# Patient Record
Sex: FEMALE | Race: WHITE | NOT HISPANIC OR LATINO | Employment: FULL TIME | URBAN - METROPOLITAN AREA
[De-identification: names, ages, dates, MRNs, and addresses within clinical notes are randomized per-mention and may not be internally consistent; named-entity substitution may affect disease eponyms.]

---

## 2017-03-01 ENCOUNTER — OFFICE VISIT (OUTPATIENT)
Dept: INTERNAL MEDICINE | Facility: CLINIC | Age: 57
End: 2017-03-01

## 2017-03-01 VITALS
BODY MASS INDEX: 30.01 KG/M2 | DIASTOLIC BLOOD PRESSURE: 70 MMHG | SYSTOLIC BLOOD PRESSURE: 122 MMHG | HEIGHT: 68 IN | WEIGHT: 198 LBS

## 2017-03-01 DIAGNOSIS — E78.5 HYPERLIPIDEMIA, UNSPECIFIED HYPERLIPIDEMIA TYPE: Primary | ICD-10-CM

## 2017-03-01 DIAGNOSIS — E03.9 HYPOTHYROIDISM, ADULT: ICD-10-CM

## 2017-03-01 PROCEDURE — 99213 OFFICE O/P EST LOW 20 MIN: CPT | Performed by: INTERNAL MEDICINE

## 2017-03-01 RX ORDER — ALPRAZOLAM 0.5 MG/1
0.5 TABLET ORAL NIGHTLY PRN
Qty: 30 TABLET | Refills: 3 | Status: SHIPPED | OUTPATIENT
Start: 2017-03-01 | End: 2018-02-16 | Stop reason: SDUPTHER

## 2017-03-01 NOTE — PROGRESS NOTES
"Chief Complaint   Patient presents with   • Hyperlipidemia     6 month follow up   • Hypothyroidism        Subjective   Ivonne Camara is a 56 y.o. female     HPI: Hyperlipidemia:  No management changes were made at her last appointment. Her most recent lipid panel was done on 10/28/2016.  Total cholesterol was 201, Triglycerides 134, HDL 62, .  She is managing this with prudent diet and regular exercise.  She states that she could improve her habits.    Hypothyroidism: Current treatment levothyroxine. She is compliant with the medication, tolerates it well and reports good control of symptoms of hypothyroidism.  She has not noted any change in tolerance of heat or cold.  No change in hair or skin.  No constipation.  No symptoms of hyperthyroidism.                The following portions of the patient's history were reviewed and updated as appropriate: allergies, current medications, past social history, problem list, past surgical history    Review of Systems   Constitutional: Negative for activity change and appetite change.   HENT: Negative for nosebleeds.    Eyes: Negative for visual disturbance.   Respiratory: Negative for cough and shortness of breath.    Cardiovascular: Negative for chest pain, palpitations and leg swelling.   Endocrine: Negative for cold intolerance and heat intolerance.   Neurological: Negative for headaches.   Psychiatric/Behavioral: Negative for sleep disturbance.       Visit Vitals   • /70   • Ht 68\" (172.7 cm)   • Wt 198 lb (89.8 kg)   • BMI 30.11 kg/m2        Objective   Physical Exam   Constitutional: She is oriented to person, place, and time. She appears well-developed and well-nourished. No distress.   Neck: Normal carotid pulses present. Carotid bruit is not present.   Cardiovascular: Normal rate, regular rhythm, S1 normal, S2 normal and intact distal pulses.  Exam reveals no gallop and no friction rub.    No murmur heard.  Pulses:       Carotid pulses are 2+ on the " right side, and 2+ on the left side.  Pulmonary/Chest: Effort normal and breath sounds normal. No respiratory distress. She has no wheezes. She has no rhonchi. She has no rales. Chest wall is not dull to percussion.   Musculoskeletal: She exhibits no edema.   Neurological: She is alert and oriented to person, place, and time.   Skin: Skin is warm and dry.   Nursing note and vitals reviewed.      Assessment/Plan   Problem List Items Addressed This Visit        Cardiovascular and Mediastinum    Hyperlipidemia - Primary       Endocrine    Hypothyroidism, adult

## 2017-04-21 RX ORDER — LEVOTHYROXINE SODIUM 88 MCG
TABLET ORAL
Qty: 90 TABLET | Refills: 1 | Status: SHIPPED | OUTPATIENT
Start: 2017-04-21 | End: 2017-11-05 | Stop reason: SDUPTHER

## 2017-09-01 ENCOUNTER — OFFICE VISIT (OUTPATIENT)
Dept: INTERNAL MEDICINE | Facility: CLINIC | Age: 57
End: 2017-09-01

## 2017-09-01 VITALS
HEIGHT: 68 IN | DIASTOLIC BLOOD PRESSURE: 80 MMHG | WEIGHT: 200 LBS | SYSTOLIC BLOOD PRESSURE: 142 MMHG | BODY MASS INDEX: 30.31 KG/M2

## 2017-09-01 DIAGNOSIS — E78.5 HYPERLIPIDEMIA, UNSPECIFIED HYPERLIPIDEMIA TYPE: Primary | ICD-10-CM

## 2017-09-01 DIAGNOSIS — E03.9 HYPOTHYROIDISM, ADULT: ICD-10-CM

## 2017-09-01 PROCEDURE — 99213 OFFICE O/P EST LOW 20 MIN: CPT | Performed by: INTERNAL MEDICINE

## 2017-09-01 NOTE — PROGRESS NOTES
"Chief Complaint   Patient presents with   • Hyperlipidemia     6 month follow up   • Hypothyroidism        Subjective   Ivonne Camara is a 57 y.o. female     HPI: She comes in for follow-up of hyperlipidemia and hypothyroidism.  She continues to take Synthroid 88 µg daily for hypothyroidism.  She is tolerating medication well.  She denies changes in the way she tolerates heat and cold, no changes in hair or skin.  Her energy is good.  Appetite good.  She could watch her diet better in order to control her weight.  She is trying to control hyperlipidemia with diet and exercise.  She does not have chest pain or palpitations.    The following portions of the patient's history were reviewed and updated as appropriate: allergies, current medications, past social history, problem list, past surgical history    Review of Systems   Constitutional: Negative for activity change and appetite change.   HENT: Negative for nosebleeds.    Eyes: Negative for visual disturbance.   Respiratory: Negative for cough and shortness of breath.    Cardiovascular: Negative for chest pain, palpitations and leg swelling.   Neurological: Negative for headaches.   Psychiatric/Behavioral: Negative for sleep disturbance.           Objective     /80  Ht 68\" (172.7 cm)  Wt 200 lb (90.7 kg)  BMI 30.41 kg/m2     Physical Exam   Constitutional: She is oriented to person, place, and time. She appears well-developed and well-nourished. No distress.   Neck: Normal carotid pulses present. Carotid bruit is not present.   Cardiovascular: Normal rate, regular rhythm, S1 normal, S2 normal and intact distal pulses.  Exam reveals no gallop and no friction rub.    No murmur heard.  Pulses:       Carotid pulses are 2+ on the right side, and 2+ on the left side.  Pulmonary/Chest: Effort normal and breath sounds normal. No respiratory distress. She has no wheezes. She has no rhonchi. She has no rales. Chest wall is not dull to percussion. "   Musculoskeletal: She exhibits no edema.   Neurological: She is alert and oriented to person, place, and time.   Skin: Skin is warm and dry.   Nursing note and vitals reviewed.      Assessment/Plan   Problem List Items Addressed This Visit        Cardiovascular and Mediastinum    Hyperlipidemia - Primary       Endocrine    Hypothyroidism, adult    Relevant Orders    Comprehensive Metabolic Panel    Urinalysis With / Microscopic If Indicated    Lipid Panel    TSH    CBC (No Diff)      She will continue her current medications.  Encouraged prudent diet, regular exercise.  She will return for a physical without fasting labs in about 6 months.

## 2017-10-05 ENCOUNTER — APPOINTMENT (OUTPATIENT)
Dept: WOMENS IMAGING | Facility: HOSPITAL | Age: 57
End: 2017-10-05

## 2017-10-05 PROCEDURE — G0202 SCR MAMMO BI INCL CAD: HCPCS | Performed by: RADIOLOGY

## 2017-10-05 PROCEDURE — 77063 BREAST TOMOSYNTHESIS BI: CPT | Performed by: RADIOLOGY

## 2017-10-05 PROCEDURE — 77067 SCR MAMMO BI INCL CAD: CPT | Performed by: RADIOLOGY

## 2017-11-06 RX ORDER — LEVOTHYROXINE SODIUM 88 MCG
TABLET ORAL
Qty: 90 TABLET | Refills: 3 | Status: SHIPPED | OUTPATIENT
Start: 2017-11-06 | End: 2017-11-20

## 2017-11-17 ENCOUNTER — LAB (OUTPATIENT)
Dept: INTERNAL MEDICINE | Facility: CLINIC | Age: 57
End: 2017-11-17

## 2017-11-17 DIAGNOSIS — E03.9 HYPOTHYROIDISM, ADULT: ICD-10-CM

## 2017-11-17 LAB
ALBUMIN SERPL-MCNC: 4.6 G/DL (ref 3.5–5.2)
ALBUMIN/GLOB SERPL: 1.5 G/DL
ALP SERPL-CCNC: 68 U/L (ref 39–117)
ALT SERPL-CCNC: 16 U/L (ref 1–33)
AST SERPL-CCNC: 15 U/L (ref 1–32)
BACTERIA UR QL AUTO: ABNORMAL /HPF
BILIRUB SERPL-MCNC: 0.7 MG/DL (ref 0.1–1.2)
BILIRUB UR QL STRIP: NEGATIVE
BUN SERPL-MCNC: 16 MG/DL (ref 6–20)
BUN/CREAT SERPL: 16.5 (ref 7–25)
CALCIUM SERPL-MCNC: 10 MG/DL (ref 8.6–10.5)
CHLORIDE SERPL-SCNC: 102 MMOL/L (ref 98–107)
CHOLEST SERPL-MCNC: 197 MG/DL (ref 0–200)
CLARITY UR: ABNORMAL
CO2 SERPL-SCNC: 29.8 MMOL/L (ref 22–29)
COLOR UR: YELLOW
CREAT SERPL-MCNC: 0.97 MG/DL (ref 0.57–1)
DEPRECATED RDW RBC AUTO: 41.3 FL (ref 37–54)
ERYTHROCYTE [DISTWIDTH] IN BLOOD BY AUTOMATED COUNT: 12.6 % (ref 11.5–15)
GLOBULIN SER CALC-MCNC: 3 GM/DL
GLUCOSE SERPL-MCNC: 104 MG/DL (ref 65–99)
GLUCOSE UR STRIP-MCNC: NEGATIVE MG/DL
HCT VFR BLD AUTO: 40.9 % (ref 34.1–44.9)
HDLC SERPL-MCNC: 52 MG/DL (ref 40–60)
HGB BLD-MCNC: 13.5 G/DL (ref 11.2–15.7)
HGB UR QL STRIP.AUTO: ABNORMAL
HYALINE CASTS UR QL AUTO: ABNORMAL /LPF
KETONES UR QL STRIP: NEGATIVE
LDLC SERPL CALC-MCNC: 113 MG/DL (ref 0–100)
LEUKOCYTE ESTERASE UR QL STRIP.AUTO: ABNORMAL
MCH RBC QN AUTO: 30.7 PG (ref 26–34)
MCHC RBC AUTO-ENTMCNC: 33 G/DL (ref 31–37)
MCV RBC AUTO: 93 FL (ref 80–100)
MUCOUS THREADS URNS QL MICRO: ABNORMAL /HPF
NITRITE UR QL STRIP: NEGATIVE
PH UR STRIP.AUTO: 5.5 [PH] (ref 5–8)
PLATELET # BLD AUTO: 264 10*3/MM3 (ref 150–450)
PMV BLD AUTO: 10.3 FL (ref 6–12)
POTASSIUM SERPL-SCNC: 4.3 MMOL/L (ref 3.5–5.2)
PROT SERPL-MCNC: 7.6 G/DL (ref 6–8.5)
PROT UR QL STRIP: NEGATIVE
RBC # BLD AUTO: 4.4 10*6/MM3 (ref 3.93–5.22)
RBC # UR: ABNORMAL /HPF
REF LAB TEST METHOD: ABNORMAL
SODIUM SERPL-SCNC: 143 MMOL/L (ref 136–145)
SP GR UR STRIP: 1.02 (ref 1–1.03)
SQUAMOUS #/AREA URNS HPF: ABNORMAL /HPF
TRIGL SERPL-MCNC: 158 MG/DL (ref 0–150)
TSH SERPL-ACNC: 7.4 MIU/ML (ref 0.27–4.2)
UROBILINOGEN UR QL STRIP: ABNORMAL
VLDLC SERPL-MCNC: 31.6 MG/DL (ref 5–40)
WBC NRBC COR # BLD: 6.53 10*3/MM3 (ref 5–10)
WBC UR QL AUTO: ABNORMAL /HPF

## 2017-11-17 PROCEDURE — 85027 COMPLETE CBC AUTOMATED: CPT | Performed by: INTERNAL MEDICINE

## 2017-11-17 PROCEDURE — 36415 COLL VENOUS BLD VENIPUNCTURE: CPT | Performed by: INTERNAL MEDICINE

## 2017-11-17 PROCEDURE — 81001 URINALYSIS AUTO W/SCOPE: CPT | Performed by: INTERNAL MEDICINE

## 2017-11-20 RX ORDER — LEVOTHYROXINE SODIUM 100 MCG
100 TABLET ORAL DAILY
Qty: 90 TABLET | Refills: 1 | Status: SHIPPED | OUTPATIENT
Start: 2017-11-20 | End: 2017-11-27 | Stop reason: SDUPTHER

## 2017-11-27 ENCOUNTER — TELEPHONE (OUTPATIENT)
Dept: INTERNAL MEDICINE | Facility: CLINIC | Age: 57
End: 2017-11-27

## 2017-11-27 RX ORDER — LEVOTHYROXINE SODIUM 0.1 MG/1
100 TABLET ORAL DAILY
Qty: 90 TABLET | Refills: 1 | Status: SHIPPED | OUTPATIENT
Start: 2017-11-27 | End: 2018-05-18 | Stop reason: SDUPTHER

## 2017-11-27 NOTE — TELEPHONE ENCOUNTER
----- Message from Debbie Barnett sent at 11/27/2017  8:54 AM EST -----  Contact: Patient  Patient called.  Her mail order pharmacy called her for authorization on name brand medication; she would not give it due to cost, and asks that we call in generic for her.  Please advise.     Needs GENERIC FOR SYNTHROID 100 MCG tablet    Patient:  [phone]    Pharmacy:  [facility] - [place], [place] - [address] AT [address] [facility] - [phone] [facility] [phone] FX

## 2017-11-27 NOTE — TELEPHONE ENCOUNTER
----- Message from Latoya Roberts MD sent at 11/20/2017  9:59 AM EST -----  Please let her know her lab results show TSH is out of range.  I recommend we increase her dose of thyroid medication from 88 µg to 100 µg.  I sent in a prescription for this to her mail-order pharmacy.     Otherwise, her lab results are generally good.  Her glucose was just a little bit high.  Triglycerides and LDL cholesterol levels a little bit high.  These might improve with the increase in her thyroid medication.  Blood count is good.  Urinalysis good.  There were some white blood cells and bacteria in the urine.  However, there were also some skin cells in the urine.  The presence of skin cells suggests  the white blood cells and bacteria may be skin contaminant.    OK to send her a copy of the results.

## 2017-11-27 NOTE — TELEPHONE ENCOUNTER
Informed patient of lab results. She ask when is she to come redo lab work due to the increase of her thyroid med.

## 2018-01-19 DIAGNOSIS — E03.9 HYPOTHYROIDISM, ADULT: Primary | ICD-10-CM

## 2018-01-23 ENCOUNTER — LAB (OUTPATIENT)
Dept: INTERNAL MEDICINE | Facility: CLINIC | Age: 58
End: 2018-01-23

## 2018-01-23 DIAGNOSIS — E03.9 HYPOTHYROIDISM, ADULT: ICD-10-CM

## 2018-01-23 LAB — TSH SERPL-ACNC: 2.9 MIU/ML (ref 0.27–4.2)

## 2018-02-16 RX ORDER — ALPRAZOLAM 0.5 MG/1
TABLET ORAL
Qty: 30 TABLET | Refills: 2 | OUTPATIENT
Start: 2018-02-16 | End: 2018-09-11 | Stop reason: SDUPTHER

## 2018-03-02 ENCOUNTER — OFFICE VISIT (OUTPATIENT)
Dept: INTERNAL MEDICINE | Facility: CLINIC | Age: 58
End: 2018-03-02

## 2018-03-02 VITALS
DIASTOLIC BLOOD PRESSURE: 80 MMHG | HEIGHT: 68 IN | WEIGHT: 199 LBS | BODY MASS INDEX: 30.16 KG/M2 | SYSTOLIC BLOOD PRESSURE: 115 MMHG

## 2018-03-02 DIAGNOSIS — Z00.00 ANNUAL PHYSICAL EXAM: Primary | ICD-10-CM

## 2018-03-02 PROCEDURE — 93000 ELECTROCARDIOGRAM COMPLETE: CPT | Performed by: INTERNAL MEDICINE

## 2018-03-02 PROCEDURE — 99396 PREV VISIT EST AGE 40-64: CPT | Performed by: INTERNAL MEDICINE

## 2018-03-02 NOTE — PROGRESS NOTES
"Chief Complaint   Patient presents with   • Hyperlipidemia   • Hypothyroidism   • Annual Exam     physical        Subjective   Ivonne Camara is a 57 y.o. female     HPI: She is here for annual examination.    She generally feels healthy.    Her  appetite is good.    She  tries to follow a prudent diet.   She sleeps well.    Her  energy is good.     She exercises 3 days a week.   Stress: mother has dementia, lives with her.   She up to date on dental exam.  Needs to have eye exam. She is up to date on mammogram and pap. In the past, she has declined colonoscopy.       The following portions of the patient's history were reviewed and updated as appropriate: allergies, current medications, past social history, problem list, past surgical history    Review of Systems   Constitutional: Negative for appetite change, chills, fatigue and fever.   HENT: Negative for congestion, ear pain, sinus pressure, sneezing, sore throat, tinnitus, trouble swallowing and voice change.    Eyes: Positive for visual disturbance.   Respiratory: Negative for cough and shortness of breath.    Cardiovascular: Negative for chest pain, palpitations and leg swelling.   Gastrointestinal: Negative for abdominal pain, constipation, diarrhea, nausea and vomiting.   Endocrine: Negative for cold intolerance, heat intolerance, polydipsia and polyuria.   Genitourinary: Negative for dysuria and frequency.   Musculoskeletal: Negative for arthralgias, back pain and gait problem.   Skin: Negative for rash.   Allergic/Immunologic: Positive for environmental allergies (seasonal).   Neurological: Positive for headaches (maybe some dull sinus headache). Negative for dizziness and syncope.   Hematological: Negative for adenopathy. Does not bruise/bleed easily.   Psychiatric/Behavioral: Negative for decreased concentration, dysphoric mood and sleep disturbance. The patient is nervous/anxious.            Objective     /80  Ht 172.7 cm (68\")  Wt 90.3 kg " (199 lb)  BMI 30.26 kg/m2       Physical Exam   Constitutional: She is oriented to person, place, and time. She appears well-developed and well-nourished. No distress.   HENT:   Right Ear: Hearing, tympanic membrane, external ear and ear canal normal.   Left Ear: Hearing, tympanic membrane, external ear and ear canal normal.   Nose: Right sinus exhibits no maxillary sinus tenderness and no frontal sinus tenderness. Left sinus exhibits no maxillary sinus tenderness and no frontal sinus tenderness.   Eyes: Conjunctivae, EOM and lids are normal. Pupils are equal, round, and reactive to light.   Neck: Trachea normal and phonation normal. Neck supple. Normal carotid pulses and no JVD present. Carotid bruit is not present. No thyroid mass and no thyromegaly present.   Cardiovascular: Normal rate, regular rhythm, S1 normal and S2 normal.  PMI is not displaced.  Exam reveals no gallop and no friction rub.    No murmur heard.  Pulses:       Carotid pulses are 2+ on the right side, and 2+ on the left side.       Radial pulses are 2+ on the right side, and 2+ on the left side.        Dorsalis pedis pulses are 2+ on the right side, and 2+ on the left side.   Pulmonary/Chest: Effort normal and breath sounds normal. She has no wheezes. She has no rhonchi. She has no rales. Chest wall is not dull to percussion.   Abdominal: Soft. Normal appearance, normal aorta and bowel sounds are normal. She exhibits no abdominal bruit and no mass. There is no hepatosplenomegaly. There is no tenderness.   Musculoskeletal: Normal range of motion. She exhibits no edema or tenderness.   Lymphadenopathy:     She has no cervical adenopathy.        Right: No supraclavicular adenopathy present.        Left: No supraclavicular adenopathy present.   Neurological: She is alert and oriented to person, place, and time. She has normal strength and normal reflexes. No cranial nerve deficit or sensory deficit. Coordination normal.   Skin: Skin is warm and  dry. No rash noted.   Psychiatric: She has a normal mood and affect. Her speech is normal and behavior is normal. Judgment and thought content normal. Cognition and memory are normal. She is attentive.   Nursing note and vitals reviewed.      ECG 12 Lead  Date/Time: 3/2/2018 9:30 AM  Performed by: GRIFFIN GARBER  Authorized by: WILLOW LACEY   Comparison: compared with previous ECG from 9/21/2015  Similar to previous ECG  Rhythm: sinus rhythm  Rate: normal  BPM: 67  Conduction: conduction normal  ST Segments: ST segments normal  T Waves: T waves normal  QRS axis: normal  Clinical impression: normal ECG            Assessment/Plan   Problem List Items Addressed This Visit     None      Visit Diagnoses     Annual physical exam    -  Primary    Relevant Orders    ECG 12 Lead        Encouraged prudent diet and regular exercise.  Reviewed labs done in November.

## 2018-05-18 RX ORDER — LEVOTHYROXINE SODIUM 100 MCG
TABLET ORAL
Qty: 90 TABLET | Refills: 1 | Status: SHIPPED | OUTPATIENT
Start: 2018-05-18 | End: 2018-08-17 | Stop reason: SDUPTHER

## 2018-08-17 RX ORDER — LEVOTHYROXINE SODIUM 100 MCG
TABLET ORAL
Qty: 90 TABLET | Refills: 1 | Status: SHIPPED | OUTPATIENT
Start: 2018-08-17 | End: 2019-05-20 | Stop reason: SDUPTHER

## 2018-09-11 ENCOUNTER — RESULTS ENCOUNTER (OUTPATIENT)
Dept: INTERNAL MEDICINE | Facility: CLINIC | Age: 58
End: 2018-09-11

## 2018-09-11 ENCOUNTER — OFFICE VISIT (OUTPATIENT)
Dept: INTERNAL MEDICINE | Facility: CLINIC | Age: 58
End: 2018-09-11

## 2018-09-11 VITALS
SYSTOLIC BLOOD PRESSURE: 112 MMHG | HEART RATE: 83 BPM | BODY MASS INDEX: 30.01 KG/M2 | HEIGHT: 68 IN | OXYGEN SATURATION: 96 % | WEIGHT: 198 LBS | DIASTOLIC BLOOD PRESSURE: 70 MMHG

## 2018-09-11 DIAGNOSIS — Z12.11 SCREENING FOR COLON CANCER: ICD-10-CM

## 2018-09-11 DIAGNOSIS — E03.9 HYPOTHYROIDISM, ADULT: Primary | ICD-10-CM

## 2018-09-11 DIAGNOSIS — F41.9 ANXIETY: ICD-10-CM

## 2018-09-11 LAB — TSH SERPL DL<=0.05 MIU/L-ACNC: 2.18 MIU/ML (ref 0.27–4.2)

## 2018-09-11 PROCEDURE — 84443 ASSAY THYROID STIM HORMONE: CPT | Performed by: INTERNAL MEDICINE

## 2018-09-11 PROCEDURE — 36415 COLL VENOUS BLD VENIPUNCTURE: CPT | Performed by: INTERNAL MEDICINE

## 2018-09-11 PROCEDURE — 99213 OFFICE O/P EST LOW 20 MIN: CPT | Performed by: INTERNAL MEDICINE

## 2018-09-11 RX ORDER — ALPRAZOLAM 0.5 MG/1
0.5 TABLET ORAL NIGHTLY PRN
Qty: 30 TABLET | Refills: 5 | Status: SHIPPED | OUTPATIENT
Start: 2018-09-11 | End: 2019-03-11 | Stop reason: SDUPTHER

## 2018-09-11 NOTE — PROGRESS NOTES
"Chief Complaint   Patient presents with   • Hyperlipidemia     6 month follow up   • Hypothyroidism   • Anxiety       Subjective   Ivonne Camara is a 58 y.o. female.     History of Present Illness        Hypothyroidism: Current treatment levothyroxine. She is compliant with the medication, tolerates it well and reports good control of symptoms of hypothyroidism.  She has not noted any change in tolerance of heat or cold.  No change in hair or skin.  No constipation.  No symptoms of hyperthyroidism.      Anxiety: She continues to have some for both anxiety.  She finds that it mainly interferes with her sleep.  She takes alprazolam at bedtime.  She does not need to do this every night.  Her elderly mother who has dementia lives with her and this is a source of anxiety.    The following portions of the patient's history were reviewed and updated as appropriate: allergies, current medications, past family history, past medical history, past social history, past surgical history and problem list.    Review of Systems   Constitutional: Negative for appetite change.   HENT: Negative for nosebleeds.    Eyes: Negative for blurred vision and double vision.   Respiratory: Negative for cough and shortness of breath.    Cardiovascular: Negative for chest pain, palpitations and leg swelling.   Endocrine: Negative for cold intolerance and heat intolerance.         Current Outpatient Prescriptions:   •  ALPRAZolam (XANAX) 0.5 MG tablet, Take 1 tablet by mouth At Night As Needed for Anxiety., Disp: 30 tablet, Rfl: 5  •  SYNTHROID 100 MCG tablet, TAKE 1 TABLET DAILY, Disp: 90 tablet, Rfl: 1        Objective     /70   Pulse 83   Ht 172.7 cm (68\")   Wt 89.8 kg (198 lb)   SpO2 96%   BMI 30.11 kg/m²     Physical Exam   Constitutional: She is oriented to person, place, and time. She appears well-developed and well-nourished. No distress.   Neck: Normal carotid pulses present. Carotid bruit is not present.   Cardiovascular: " Regular rhythm, S1 normal and S2 normal.  Exam reveals no gallop and no friction rub.    No murmur heard.  Pulses:       Carotid pulses are 2+ on the right side, and 2+ on the left side.  Pulmonary/Chest: Effort normal and breath sounds normal. She has no wheezes. She has no rhonchi. She has no rales. Chest wall is not dull to percussion.   Musculoskeletal: She exhibits no edema.   Neurological: She is alert and oriented to person, place, and time.   Skin: Skin is warm and dry.   Nursing note and vitals reviewed.        Assessment/Plan   Ivonne was seen today for hyperlipidemia, hypothyroidism and anxiety.    Diagnoses and all orders for this visit:    Hypothyroidism, adult  -     TSH Rfx On Abnormal To Free T4; Future  -     TSH Rfx On Abnormal To Free T4    Anxiety    Screening for colon cancer  -     Cologuard - Stool, Per Rectum; Future    Other orders  -     ALPRAZolam (XANAX) 0.5 MG tablet; Take 1 tablet by mouth At Night As Needed for Anxiety.      Discussed screening colonoscopy.  She would rather not do that.  She would like to do cologuard.

## 2018-10-10 ENCOUNTER — APPOINTMENT (OUTPATIENT)
Dept: WOMENS IMAGING | Facility: HOSPITAL | Age: 58
End: 2018-10-10

## 2018-10-10 PROCEDURE — 77067 SCR MAMMO BI INCL CAD: CPT | Performed by: RADIOLOGY

## 2018-10-10 PROCEDURE — 77063 BREAST TOMOSYNTHESIS BI: CPT | Performed by: RADIOLOGY

## 2019-03-11 ENCOUNTER — OFFICE VISIT (OUTPATIENT)
Dept: INTERNAL MEDICINE | Facility: CLINIC | Age: 59
End: 2019-03-11

## 2019-03-11 VITALS
BODY MASS INDEX: 30.16 KG/M2 | SYSTOLIC BLOOD PRESSURE: 122 MMHG | WEIGHT: 199 LBS | DIASTOLIC BLOOD PRESSURE: 68 MMHG | HEIGHT: 68 IN

## 2019-03-11 DIAGNOSIS — Z11.59 SCREENING FOR VIRAL DISEASE: ICD-10-CM

## 2019-03-11 DIAGNOSIS — Z00.00 ANNUAL PHYSICAL EXAM: Primary | ICD-10-CM

## 2019-03-11 LAB
ALBUMIN SERPL-MCNC: 4.1 G/DL (ref 3.5–5.2)
ALBUMIN/GLOB SERPL: 1.4 G/DL
ALP SERPL-CCNC: 61 U/L (ref 39–117)
ALT SERPL W P-5'-P-CCNC: 17 U/L (ref 1–33)
ANION GAP SERPL CALCULATED.3IONS-SCNC: 13.3 MMOL/L
AST SERPL-CCNC: 13 U/L (ref 1–32)
BACTERIA UR QL AUTO: ABNORMAL /HPF
BASOPHILS # BLD AUTO: 0.02 10*3/MM3 (ref 0–0.2)
BASOPHILS NFR BLD AUTO: 0.3 % (ref 0–1.5)
BILIRUB SERPL-MCNC: 0.4 MG/DL (ref 0.1–1.2)
BILIRUB UR QL STRIP: NEGATIVE
BUN BLD-MCNC: 11 MG/DL (ref 6–20)
BUN/CREAT SERPL: 11.8 (ref 7–25)
CALCIUM SPEC-SCNC: 9.5 MG/DL (ref 8.6–10.5)
CHLORIDE SERPL-SCNC: 103 MMOL/L (ref 98–107)
CHOLEST SERPL-MCNC: 160 MG/DL (ref 0–200)
CLARITY UR: ABNORMAL
CO2 SERPL-SCNC: 26.7 MMOL/L (ref 22–29)
COLOR UR: YELLOW
CREAT BLD-MCNC: 0.93 MG/DL (ref 0.57–1)
DEPRECATED RDW RBC AUTO: 42.5 FL (ref 37–54)
EOSINOPHIL # BLD AUTO: 0.1 10*3/MM3 (ref 0–0.4)
EOSINOPHIL NFR BLD AUTO: 1.6 % (ref 0.3–6.2)
ERYTHROCYTE [DISTWIDTH] IN BLOOD BY AUTOMATED COUNT: 13 % (ref 12.3–15.4)
GFR SERPL CREATININE-BSD FRML MDRD: 62 ML/MIN/1.73
GLOBULIN UR ELPH-MCNC: 2.9 GM/DL
GLUCOSE BLD-MCNC: 107 MG/DL (ref 65–99)
GLUCOSE UR STRIP-MCNC: NEGATIVE MG/DL
HCT VFR BLD AUTO: 39.6 % (ref 34–46.6)
HDLC SERPL-MCNC: 51 MG/DL (ref 40–60)
HGB BLD-MCNC: 12.9 G/DL (ref 12–15.9)
HGB UR QL STRIP.AUTO: ABNORMAL
HYALINE CASTS UR QL AUTO: ABNORMAL /LPF
KETONES UR QL STRIP: ABNORMAL
LDLC SERPL CALC-MCNC: 87 MG/DL (ref 0–100)
LDLC/HDLC SERPL: 1.71 {RATIO}
LEUKOCYTE ESTERASE UR QL STRIP.AUTO: ABNORMAL
LYMPHOCYTES # BLD AUTO: 2.32 10*3/MM3 (ref 0.7–3.1)
LYMPHOCYTES NFR BLD AUTO: 36.9 % (ref 19.6–45.3)
MCH RBC QN AUTO: 29.8 PG (ref 26.6–33)
MCHC RBC AUTO-ENTMCNC: 32.6 G/DL (ref 31.5–35.7)
MCV RBC AUTO: 91.5 FL (ref 79–97)
MONOCYTES # BLD AUTO: 0.49 10*3/MM3 (ref 0.1–0.9)
MONOCYTES NFR BLD AUTO: 7.8 % (ref 5–12)
MUCOUS THREADS URNS QL MICRO: ABNORMAL /HPF
NEUTROPHILS # BLD AUTO: 3.35 10*3/MM3 (ref 1.4–7)
NEUTROPHILS NFR BLD AUTO: 53.4 % (ref 42.7–76)
NITRITE UR QL STRIP: NEGATIVE
PH UR STRIP.AUTO: 5 [PH] (ref 5–8)
PLATELET # BLD AUTO: 272 10*3/MM3 (ref 140–450)
PMV BLD AUTO: 11 FL (ref 6–12)
POTASSIUM BLD-SCNC: 4.3 MMOL/L (ref 3.5–5.2)
PROT SERPL-MCNC: 7 G/DL (ref 6–8.5)
PROT UR QL STRIP: NEGATIVE
RBC # BLD AUTO: 4.33 10*6/MM3 (ref 3.77–5.28)
RBC # UR: ABNORMAL /HPF
REF LAB TEST METHOD: ABNORMAL
SODIUM BLD-SCNC: 143 MMOL/L (ref 136–145)
SP GR UR STRIP: >=1.03 (ref 1–1.03)
SQUAMOUS #/AREA URNS HPF: ABNORMAL /HPF
TRIGL SERPL-MCNC: 108 MG/DL (ref 0–150)
UROBILINOGEN UR QL STRIP: ABNORMAL
VLDLC SERPL-MCNC: 21.6 MG/DL (ref 5–40)
WBC NRBC COR # BLD: 6.28 10*3/MM3 (ref 3.4–10.8)
WBC UR QL AUTO: ABNORMAL /HPF

## 2019-03-11 PROCEDURE — 80061 LIPID PANEL: CPT | Performed by: INTERNAL MEDICINE

## 2019-03-11 PROCEDURE — 80053 COMPREHEN METABOLIC PANEL: CPT | Performed by: INTERNAL MEDICINE

## 2019-03-11 PROCEDURE — 81001 URINALYSIS AUTO W/SCOPE: CPT | Performed by: INTERNAL MEDICINE

## 2019-03-11 PROCEDURE — 85025 COMPLETE CBC W/AUTO DIFF WBC: CPT | Performed by: INTERNAL MEDICINE

## 2019-03-11 PROCEDURE — 99396 PREV VISIT EST AGE 40-64: CPT | Performed by: INTERNAL MEDICINE

## 2019-03-11 RX ORDER — ALPRAZOLAM 0.5 MG/1
0.5 TABLET ORAL NIGHTLY PRN
Qty: 30 TABLET | Refills: 5 | Status: SHIPPED | OUTPATIENT
Start: 2019-03-11 | End: 2019-09-11 | Stop reason: SDUPTHER

## 2019-03-11 NOTE — PROGRESS NOTES
Chief Complaint   Patient presents with   • Annual Exam     physical       Subjective   Ivonne Camara is a 58 y.o. female.     History of Present Illness     She is here for annual examination.  She generally feels healthy. Her  appetite is good.  She  follows a prudent diet.   She sleeps well.  Stress can interfere with sleep.  Xanax helps, also using lavender essential oil.  Stress due to mother's dementia, lives with her.   Her  energy is good.     She exercises regularly.  Gym 3 days a week.  She is due for eye exam.  She is up to date on dental exam. She sees gyn for pap, usually in October. She does 3-D mammogram at [facility].              The following portions of the patient's history were reviewed and updated as appropriate: allergies, current medications, past family history, past medical history, past social history, past surgical history and problem list.    Review of Systems   Constitutional: Negative for appetite change, chills, fatigue, fever, unexpected weight gain and unexpected weight loss.   HENT: Negative for congestion, nosebleeds, sinus pressure, sore throat, tinnitus, trouble swallowing and voice change.    Eyes: Negative for blurred vision and double vision.   Respiratory: Negative for cough and shortness of breath.    Cardiovascular: Negative for chest pain, palpitations and leg swelling.   Gastrointestinal: Negative for abdominal pain, constipation, diarrhea, nausea, vomiting and indigestion.   Endocrine: Negative for cold intolerance, heat intolerance, polydipsia and polyuria.   Genitourinary: Negative for breast discharge, dysuria, frequency and breast lump.   Musculoskeletal: Negative for arthralgias and back pain.   Skin: Negative for rash.   Neurological: Negative for dizziness, syncope and headache.   Hematological: Negative for adenopathy. Does not bruise/bleed easily.   Psychiatric/Behavioral: Negative for sleep disturbance and depressed mood. The patient is  "nervous/anxious.          Current Outpatient Medications:   •  ALPRAZolam (XANAX) 0.5 MG tablet, Take 1 tablet by mouth At Night As Needed for Anxiety., Disp: 30 tablet, Rfl: 5  •  SYNTHROID 100 MCG tablet, TAKE 1 TABLET DAILY, Disp: 90 tablet, Rfl: 1        Objective     /68 (BP Location: Left arm, Patient Position: Sitting, Cuff Size: Adult)   Ht 172.7 cm (68\")   Wt 90.3 kg (199 lb)   BMI 30.26 kg/m²     Physical Exam   Constitutional: She is oriented to person, place, and time. She appears well-developed and well-nourished. No distress.   HENT:   Right Ear: Tympanic membrane and ear canal normal.   Left Ear: Tympanic membrane and ear canal normal.   Nose: Right sinus exhibits no maxillary sinus tenderness and no frontal sinus tenderness. Left sinus exhibits no maxillary sinus tenderness and no frontal sinus tenderness.   Mouth/Throat: Oropharynx is clear and moist.   Eyes: Conjunctivae and EOM are normal. Pupils are equal, round, and reactive to light. No scleral icterus.   Neck: Normal range of motion. Neck supple. Normal carotid pulses present. Carotid bruit is not present. No tracheal deviation present. No thyromegaly present.   Cardiovascular: Regular rhythm, S1 normal, S2 normal and intact distal pulses. Exam reveals no gallop and no friction rub.   No murmur heard.  Pulses:       Carotid pulses are 2+ on the right side, and 2+ on the left side.  Pulmonary/Chest: Effort normal and breath sounds normal. She has no wheezes. She has no rhonchi. She has no rales. Chest wall is not dull to percussion.   Abdominal: Soft. Normal appearance and bowel sounds are normal. She exhibits no abdominal bruit. There is no hepatosplenomegaly. There is no tenderness. There is no rebound and no guarding.   Musculoskeletal: She exhibits no edema.   Lymphadenopathy:     She has no cervical adenopathy.   Neurological: She is alert and oriented to person, place, and time. No cranial nerve deficit. Coordination normal. "   Skin: Skin is warm and dry.   Psychiatric: She has a normal mood and affect.   Nursing note and vitals reviewed.        Assessment/Plan   Ivonne was seen today for annual exam.    Diagnoses and all orders for this visit:    Annual physical exam  -     Comprehensive Metabolic Panel; Future  -     CBC & Differential; Future  -     Urinalysis With Microscopic If Indicated (No Culture) - Urine, Clean Catch; Future  -     Lipid Panel; Future  -     TSH Rfx On Abnormal To Free T4; Future    Screening for viral disease  -     Hepatitis C Antibody; Future  -     Varicella zoster antibody, IgG; Future    Other orders  -     ALPRAZolam (XANAX) 0.5 MG tablet; Take 1 tablet by mouth At Night As Needed for Anxiety.

## 2019-03-11 NOTE — PATIENT INSTRUCTIONS
Check with your pharmacy about the new shingles vaccine.     Follow prudent diet, continue regular exercise    If the antibody level to varicella zoster is positive, it shows you had chicken pox and should get the shingles vaccine.

## 2019-03-12 LAB
HCV AB S/CO SERPL IA: <0.1 S/CO RATIO (ref 0–0.9)
TSH SERPL-ACNC: 2.64 MIU/ML (ref 0.27–4.2)
VZV IGG SER IA-ACNC: <135 INDEX

## 2019-05-20 RX ORDER — LEVOTHYROXINE SODIUM 100 MCG
TABLET ORAL
Qty: 90 TABLET | Refills: 1 | Status: SHIPPED | OUTPATIENT
Start: 2019-05-20 | End: 2019-11-14 | Stop reason: SDUPTHER

## 2019-09-11 ENCOUNTER — OFFICE VISIT (OUTPATIENT)
Dept: INTERNAL MEDICINE | Facility: CLINIC | Age: 59
End: 2019-09-11

## 2019-09-11 VITALS
BODY MASS INDEX: 29.55 KG/M2 | SYSTOLIC BLOOD PRESSURE: 124 MMHG | HEART RATE: 69 BPM | OXYGEN SATURATION: 96 % | DIASTOLIC BLOOD PRESSURE: 80 MMHG | WEIGHT: 195 LBS | HEIGHT: 68 IN

## 2019-09-11 DIAGNOSIS — E78.5 HYPERLIPIDEMIA, UNSPECIFIED HYPERLIPIDEMIA TYPE: ICD-10-CM

## 2019-09-11 DIAGNOSIS — E03.9 HYPOTHYROIDISM, ADULT: Primary | ICD-10-CM

## 2019-09-11 DIAGNOSIS — F41.9 ANXIETY: ICD-10-CM

## 2019-09-11 PROCEDURE — 99214 OFFICE O/P EST MOD 30 MIN: CPT | Performed by: INTERNAL MEDICINE

## 2019-09-11 RX ORDER — ALPRAZOLAM 0.5 MG/1
0.5 TABLET ORAL NIGHTLY PRN
Qty: 30 TABLET | Refills: 5 | Status: SHIPPED | OUTPATIENT
Start: 2019-09-11 | End: 2020-07-14

## 2019-09-11 NOTE — PROGRESS NOTES
Chief Complaint   Patient presents with   • Hypothyroidism     6 month follow up   • Hyperlipidemia       Subjective   Ivonne Camara is a 59 y.o. female.     Hypothyroidism   This is a chronic problem. Progression since onset: Controlled. Pertinent negatives include no chest pain or coughing. Associated symptoms comments: No changes in heat/cold tolerance, no change hair/skin.  No fatigue. . Treatments tried: Synthroid. The treatment provided significant relief.   Hyperlipidemia   This is a chronic problem. Recent lipid tests were reviewed and are variable. Exacerbating diseases include hypothyroidism. There are no known factors aggravating her hyperlipidemia. Pertinent negatives include no chest pain or shortness of breath. Current antihyperlipidemic treatment includes diet change and exercise. The current treatment provides moderate improvement of lipids. There are no compliance problems.    Insomnia   This is a chronic problem. The current episode started more than 1 year ago. The problem occurs daily. The problem has been unchanged. Pertinent negatives include no chest pain or coughing. The symptoms are aggravated by stress. Treatments tried: Alprazolam. The treatment provided significant relief.   She has problems with middle insomnia.  She states she is able to fall asleep okay but wakes up during the night.  Part of the problem may be that her  likes to watch television.  Sometimes she will wake up, the television will be on but he is asleep.  She turns off the television.  If he wakes up, he turns the television back on.  Also, she has had problems with stress and anxiety contributing to this.  She tries to maintain regular sleep habits.  She does not drink caffeine late in the day.  She does not drink alcohol to excess.  She does not take alprazolam every night.    The following portions of the patient's history were reviewed and updated as appropriate: allergies, current medications, past family  "history, past medical history, past social history, past surgical history and problem list.    Review of Systems   Constitutional: Negative for appetite change.   HENT: Negative for nosebleeds.    Eyes: Negative for blurred vision and double vision.        Wears a contact for distance vision.     Respiratory: Negative for cough and shortness of breath.    Cardiovascular: Negative for chest pain, palpitations and leg swelling.   Psychiatric/Behavioral: Positive for sleep disturbance. The patient has insomnia.          Current Outpatient Medications:   •  ALPRAZolam (XANAX) 0.5 MG tablet, Take 1 tablet by mouth At Night As Needed for Anxiety., Disp: 30 tablet, Rfl: 5  •  SYNTHROID 100 MCG tablet, TAKE 1 TABLET DAILY, Disp: 90 tablet, Rfl: 1        Objective     /80   Pulse 69   Ht 172.7 cm (68\")   Wt 88.5 kg (195 lb)   SpO2 96%   BMI 29.65 kg/m²     Physical Exam   Constitutional: She is oriented to person, place, and time. She appears well-developed and well-nourished. No distress.   Neck: Normal carotid pulses present. Carotid bruit is not present.   Cardiovascular: Regular rhythm, S1 normal and S2 normal. Exam reveals no gallop and no friction rub.   No murmur heard.  Pulses:       Carotid pulses are 2+ on the right side, and 2+ on the left side.  Pulmonary/Chest: Effort normal and breath sounds normal. She has no wheezes. She has no rhonchi. She has no rales. Chest wall is not dull to percussion.   Musculoskeletal: She exhibits no edema.   Neurological: She is alert and oriented to person, place, and time.   Skin: Skin is warm and dry.   Nursing note and vitals reviewed.        Assessment/Plan   Ivonne was seen today for hypothyroidism and hyperlipidemia.    Diagnoses and all orders for this visit:    Hypothyroidism, adult    Hyperlipidemia, unspecified hyperlipidemia type    Anxiety    Other orders  -     ALPRAZolam (XANAX) 0.5 MG tablet; Take 1 tablet by mouth At Night As Needed for Anxiety.      She " will continue her current medications.  Encouraged her to continue with regular sleep habits.  Ideally, the TV would be turned off at bedtime.

## 2019-10-14 ENCOUNTER — APPOINTMENT (OUTPATIENT)
Dept: WOMENS IMAGING | Facility: HOSPITAL | Age: 59
End: 2019-10-14

## 2019-10-14 PROCEDURE — 77067 SCR MAMMO BI INCL CAD: CPT | Performed by: RADIOLOGY

## 2019-10-14 PROCEDURE — 77063 BREAST TOMOSYNTHESIS BI: CPT | Performed by: RADIOLOGY

## 2019-11-14 RX ORDER — LEVOTHYROXINE SODIUM 100 MCG
TABLET ORAL
Qty: 90 TABLET | Refills: 0 | Status: SHIPPED | OUTPATIENT
Start: 2019-11-14 | End: 2020-02-10

## 2019-12-27 ENCOUNTER — OFFICE VISIT (OUTPATIENT)
Dept: INTERNAL MEDICINE | Facility: CLINIC | Age: 59
End: 2019-12-27

## 2019-12-27 VITALS
TEMPERATURE: 98.9 F | DIASTOLIC BLOOD PRESSURE: 80 MMHG | OXYGEN SATURATION: 94 % | WEIGHT: 197 LBS | HEART RATE: 87 BPM | BODY MASS INDEX: 29.86 KG/M2 | SYSTOLIC BLOOD PRESSURE: 104 MMHG | HEIGHT: 68 IN

## 2019-12-27 DIAGNOSIS — J06.9 ACUTE URI: Primary | ICD-10-CM

## 2019-12-27 PROCEDURE — 99213 OFFICE O/P EST LOW 20 MIN: CPT | Performed by: NURSE PRACTITIONER

## 2019-12-27 RX ORDER — GUAIFENESIN 600 MG/1
600 TABLET, EXTENDED RELEASE ORAL EVERY 12 HOURS SCHEDULED
Qty: 14 TABLET
Start: 2019-12-27 | End: 2020-01-03

## 2019-12-27 RX ORDER — AZITHROMYCIN 250 MG/1
TABLET, FILM COATED ORAL
Qty: 6 TABLET | Refills: 0 | Status: SHIPPED | OUTPATIENT
Start: 2019-12-27 | End: 2020-01-02

## 2019-12-27 RX ORDER — LORATADINE 10 MG/1
10 TABLET ORAL DAILY
Qty: 10 TABLET
Start: 2019-12-27 | End: 2020-01-06

## 2019-12-27 NOTE — PROGRESS NOTES
Subjective   Ivonne Camara is a 59 y.o. female who presents due to respiratory symptoms.    Cough   Associated symptoms include ear pain, headaches, postnasal drip, rhinorrhea and a sore throat. Pertinent negatives include no chest pain, chills, fever, shortness of breath or wheezing.   URI    This is a new problem. The current episode started in the past 7 days. The problem has been rapidly worsening. There has been no fever. Associated symptoms include congestion, coughing, ear pain, headaches, rhinorrhea and a sore throat. Pertinent negatives include no abdominal pain, chest pain, diarrhea, nausea, neck pain, sneezing, vomiting or wheezing. Treatments tried: Mucinex. The treatment provided mild relief.        The following portions of the patient's history were reviewed and updated as appropriate: allergies, current medications, past social history and problem list.    Past Medical History:   Diagnosis Date   • H/O mammogram    • Hypercholesterolemia    • Hypothyroidism    • Renal calculi          Current Outpatient Medications:   •  ALPRAZolam (XANAX) 0.5 MG tablet, Take 1 tablet by mouth At Night As Needed for Anxiety., Disp: 30 tablet, Rfl: 5  •  SYNTHROID 100 MCG tablet, TAKE 1 TABLET DAILY, Disp: 90 tablet, Rfl: 0  •  azithromycin (ZITHROMAX Z-MILAGROS) 250 MG tablet, Take 2 tablets the first day, then 1 tablet daily for 4 days., Disp: 6 tablet, Rfl: 0  •  guaiFENesin (MUCINEX) 600 MG 12 hr tablet, Take 1 tablet by mouth Every 12 (Twelve) Hours for 7 days., Disp: 14 tablet, Rfl:   •  loratadine (CLARITIN) 10 MG tablet, Take 1 tablet by mouth Daily for 10 days., Disp: 10 tablet, Rfl:     No Known Allergies    Review of Systems   Constitutional: Negative for appetite change, chills, fatigue and fever.   HENT: Positive for congestion, ear pain, postnasal drip, rhinorrhea, sinus pressure and sore throat. Negative for ear discharge, facial swelling, sneezing and tinnitus.    Respiratory: Positive for cough.  "Negative for chest tightness, shortness of breath and wheezing.    Cardiovascular: Negative for chest pain, palpitations and leg swelling.   Gastrointestinal: Negative for abdominal pain, diarrhea, nausea and vomiting.   Musculoskeletal: Negative for neck pain and neck stiffness.   Neurological: Positive for headaches.   Hematological: Negative for adenopathy.       Objective   Vitals:    12/27/19 0811   BP: 104/80   Pulse: 87   Temp: 98.9 °F (37.2 °C)   SpO2: 94%   Weight: 89.4 kg (197 lb)   Height: 172.7 cm (67.99\")     Body mass index is 29.96 kg/m².  Physical Exam   Constitutional: She appears well-developed and well-nourished. She is cooperative. She does not have a sickly appearance. She does not appear ill.   HENT:   Head: Normocephalic.   Right Ear: Hearing and external ear normal. No drainage, swelling or tenderness. Tympanic membrane is bulging. Tympanic membrane is not erythematous. No middle ear effusion. No decreased hearing is noted.   Left Ear: Hearing and external ear normal. No drainage, swelling or tenderness. Tympanic membrane is bulging. Tympanic membrane is not erythematous.  No middle ear effusion. No decreased hearing is noted.   Nose: Nose normal. No mucosal edema, rhinorrhea, sinus tenderness or nasal deformity. Right sinus exhibits no maxillary sinus tenderness and no frontal sinus tenderness. Left sinus exhibits no maxillary sinus tenderness and no frontal sinus tenderness.   Mouth/Throat: Mucous membranes are normal. Posterior oropharyngeal erythema present.   Eyes: Conjunctivae and lids are normal.   Neck: Trachea normal.   Cardiovascular: Regular rhythm, normal heart sounds and normal pulses.   No murmur heard.  Pulmonary/Chest: Breath sounds normal. No respiratory distress. She has no decreased breath sounds. She has no wheezes. She has no rhonchi. She has no rales.   Lymphadenopathy:     She has no cervical adenopathy.   Neurological: She is alert.   Skin: Skin is warm, dry and " intact.   Nursing note and vitals reviewed.      Assessment/Plan   Ivonne was seen today for sore throat and cough.    Diagnoses and all orders for this visit:    Acute URI  -     azithromycin (ZITHROMAX Z-MILAGROS) 250 MG tablet; Take 2 tablets the first day, then 1 tablet daily for 4 days.  -     guaiFENesin (MUCINEX) 600 MG 12 hr tablet; Take 1 tablet by mouth Every 12 (Twelve) Hours for 7 days.  -     loratadine (CLARITIN) 10 MG tablet; Take 1 tablet by mouth Daily for 10 days.    She will continue Mucinex for increased postnasal drainage and cough.

## 2020-02-10 RX ORDER — LEVOTHYROXINE SODIUM 100 MCG
TABLET ORAL
Qty: 90 TABLET | Refills: 1 | Status: SHIPPED | OUTPATIENT
Start: 2020-02-10 | End: 2020-07-27

## 2020-03-09 DIAGNOSIS — Z00.00 ANNUAL PHYSICAL EXAM: Primary | ICD-10-CM

## 2020-03-10 ENCOUNTER — LAB (OUTPATIENT)
Dept: INTERNAL MEDICINE | Facility: CLINIC | Age: 60
End: 2020-03-10

## 2020-03-10 DIAGNOSIS — R82.90 ABNORMAL URINE FINDINGS: ICD-10-CM

## 2020-03-10 DIAGNOSIS — Z00.00 ANNUAL PHYSICAL EXAM: Primary | ICD-10-CM

## 2020-03-10 LAB
ALBUMIN SERPL-MCNC: 4.1 G/DL (ref 3.5–5.2)
ALBUMIN/GLOB SERPL: 1.4 G/DL
ALP SERPL-CCNC: 62 U/L (ref 39–117)
ALT SERPL W P-5'-P-CCNC: 23 U/L (ref 1–33)
ANION GAP SERPL CALCULATED.3IONS-SCNC: 10.6 MMOL/L (ref 5–15)
AST SERPL-CCNC: 22 U/L (ref 1–32)
BACTERIA UR QL AUTO: ABNORMAL /HPF
BILIRUB SERPL-MCNC: 0.5 MG/DL (ref 0.2–1.2)
BILIRUB UR QL STRIP: NEGATIVE
BUN BLD-MCNC: 16 MG/DL (ref 6–20)
BUN/CREAT SERPL: 15.7 (ref 7–25)
CALCIUM SPEC-SCNC: 9.4 MG/DL (ref 8.6–10.5)
CHLORIDE SERPL-SCNC: 102 MMOL/L (ref 98–107)
CHOLEST SERPL-MCNC: 181 MG/DL (ref 0–200)
CLARITY UR: ABNORMAL
CO2 SERPL-SCNC: 27.4 MMOL/L (ref 22–29)
COLOR UR: YELLOW
CREAT BLD-MCNC: 1.02 MG/DL (ref 0.57–1)
DEPRECATED RDW RBC AUTO: 41.4 FL (ref 37–54)
ERYTHROCYTE [DISTWIDTH] IN BLOOD BY AUTOMATED COUNT: 12.6 % (ref 12.3–15.4)
GFR SERPL CREATININE-BSD FRML MDRD: 56 ML/MIN/1.73
GLOBULIN UR ELPH-MCNC: 2.9 GM/DL
GLUCOSE BLD-MCNC: 104 MG/DL (ref 65–99)
GLUCOSE UR STRIP-MCNC: NEGATIVE MG/DL
HCT VFR BLD AUTO: 41.7 % (ref 34–46.6)
HDLC SERPL-MCNC: 52 MG/DL (ref 40–60)
HGB BLD-MCNC: 13.5 G/DL (ref 12–15.9)
HGB UR QL STRIP.AUTO: ABNORMAL
HYALINE CASTS UR QL AUTO: ABNORMAL /LPF
KETONES UR QL STRIP: NEGATIVE
LDLC SERPL CALC-MCNC: 103 MG/DL (ref 0–100)
LDLC/HDLC SERPL: 1.98 {RATIO}
LEUKOCYTE ESTERASE UR QL STRIP.AUTO: ABNORMAL
MCH RBC QN AUTO: 29.7 PG (ref 26.6–33)
MCHC RBC AUTO-ENTMCNC: 32.4 G/DL (ref 31.5–35.7)
MCV RBC AUTO: 91.9 FL (ref 79–97)
MUCOUS THREADS URNS QL MICRO: ABNORMAL /HPF
NITRITE UR QL STRIP: NEGATIVE
PH UR STRIP.AUTO: 6 [PH] (ref 5–8)
PLATELET # BLD AUTO: 291 10*3/MM3 (ref 140–450)
PMV BLD AUTO: 11 FL (ref 6–12)
POTASSIUM BLD-SCNC: 4.4 MMOL/L (ref 3.5–5.2)
PROT SERPL-MCNC: 7 G/DL (ref 6–8.5)
PROT UR QL STRIP: NEGATIVE
RBC # BLD AUTO: 4.54 10*6/MM3 (ref 3.77–5.28)
RBC # UR: ABNORMAL /HPF
REF LAB TEST METHOD: ABNORMAL
SODIUM BLD-SCNC: 140 MMOL/L (ref 136–145)
SP GR UR STRIP: 1.02 (ref 1–1.03)
SQUAMOUS #/AREA URNS HPF: ABNORMAL /HPF
TRIGL SERPL-MCNC: 131 MG/DL (ref 0–150)
TSH SERPL DL<=0.05 MIU/L-ACNC: 1.78 UIU/ML (ref 0.27–4.2)
UROBILINOGEN UR QL STRIP: ABNORMAL
VLDLC SERPL-MCNC: 26.2 MG/DL (ref 5–40)
WBC NRBC COR # BLD: 6.44 10*3/MM3 (ref 3.4–10.8)
WBC UR QL AUTO: ABNORMAL /HPF

## 2020-03-10 PROCEDURE — 80053 COMPREHEN METABOLIC PANEL: CPT | Performed by: INTERNAL MEDICINE

## 2020-03-10 PROCEDURE — 36415 COLL VENOUS BLD VENIPUNCTURE: CPT | Performed by: INTERNAL MEDICINE

## 2020-03-10 PROCEDURE — 81001 URINALYSIS AUTO W/SCOPE: CPT | Performed by: INTERNAL MEDICINE

## 2020-03-10 PROCEDURE — 80061 LIPID PANEL: CPT | Performed by: INTERNAL MEDICINE

## 2020-03-10 PROCEDURE — 84443 ASSAY THYROID STIM HORMONE: CPT | Performed by: INTERNAL MEDICINE

## 2020-03-10 PROCEDURE — 87086 URINE CULTURE/COLONY COUNT: CPT | Performed by: INTERNAL MEDICINE

## 2020-03-10 PROCEDURE — 85027 COMPLETE CBC AUTOMATED: CPT | Performed by: INTERNAL MEDICINE

## 2020-03-12 LAB — BACTERIA SPEC AEROBE CULT: NORMAL

## 2020-03-17 ENCOUNTER — OFFICE VISIT (OUTPATIENT)
Dept: INTERNAL MEDICINE | Facility: CLINIC | Age: 60
End: 2020-03-17

## 2020-03-17 VITALS
BODY MASS INDEX: 30.43 KG/M2 | HEART RATE: 64 BPM | SYSTOLIC BLOOD PRESSURE: 100 MMHG | OXYGEN SATURATION: 98 % | DIASTOLIC BLOOD PRESSURE: 82 MMHG | WEIGHT: 200.8 LBS | HEIGHT: 68 IN

## 2020-03-17 DIAGNOSIS — F41.9 ANXIETY: ICD-10-CM

## 2020-03-17 DIAGNOSIS — Z00.00 ANNUAL PHYSICAL EXAM: Primary | ICD-10-CM

## 2020-03-17 DIAGNOSIS — R39.9 URINARY SYMPTOM OR SIGN: ICD-10-CM

## 2020-03-17 PROCEDURE — 99396 PREV VISIT EST AGE 40-64: CPT | Performed by: INTERNAL MEDICINE

## 2020-03-17 NOTE — PROGRESS NOTES
Chief Complaint   Patient presents with   • Annual Exam       Subjective   Ivonne Camara is a 59 y.o. female.     History of Present Illness     She is here for annual examination.    She generally feels healthy.  Her appetite is good.  Her diet could be improved.  She reports she eats sweets, more than she thinks she should.  She sleeps fairly well.  She reports some nocturia.  She takes Xanax occasionally for anxiety.  This helps her sleep.  Her energy is good.  She exercises regularly.  She is up-to-date on dental exams and eye exams.  She sees her gynecologist for Pap smears.  She is up-to-date on those and she is up-to-date on mammogram.             The following portions of the patient's history were reviewed and updated as appropriate: allergies, current medications, past family history, past medical history, past social history, past surgical history and problem list.    Review of Systems   Constitutional: Negative for appetite change, chills, fatigue, fever, unexpected weight gain and unexpected weight loss.   HENT: Negative for congestion, nosebleeds, sinus pressure, sore throat and trouble swallowing.    Eyes: Negative for blurred vision and double vision.   Respiratory: Negative for cough and shortness of breath.    Cardiovascular: Negative for chest pain, palpitations and leg swelling.   Gastrointestinal: Negative for abdominal pain, constipation, diarrhea, nausea, vomiting and indigestion.   Endocrine: Negative for cold intolerance, heat intolerance, polydipsia and polyuria.   Genitourinary: Negative for frequency.   Musculoskeletal: Negative for arthralgias and back pain.   Skin: Negative for rash.   Neurological: Negative for dizziness and headache.   Hematological: Negative for adenopathy. Does not bruise/bleed easily.   Psychiatric/Behavioral: Negative for sleep disturbance and depressed mood. The patient is nervous/anxious.          Current Outpatient Medications:   •  ALPRAZolam (XANAX) 0.5 MG  "tablet, Take 1 tablet by mouth At Night As Needed for Anxiety., Disp: 30 tablet, Rfl: 5  •  SYNTHROID 100 MCG tablet, TAKE 1 TABLET DAILY, Disp: 90 tablet, Rfl: 1        Objective     /82 (BP Location: Left arm, Patient Position: Sitting, Cuff Size: Adult)   Pulse 64   Ht 172.7 cm (68\")   Wt 91.1 kg (200 lb 12.8 oz)   SpO2 98%   BMI 30.53 kg/m²     Physical Exam   Constitutional: She is oriented to person, place, and time. She appears well-developed and well-nourished. No distress.   HENT:   Right Ear: Tympanic membrane and ear canal normal.   Left Ear: Tympanic membrane and ear canal normal.   Nose: Right sinus exhibits no maxillary sinus tenderness and no frontal sinus tenderness. Left sinus exhibits no maxillary sinus tenderness and no frontal sinus tenderness.   Mouth/Throat: Oropharynx is clear and moist.   Eyes: Pupils are equal, round, and reactive to light. Conjunctivae and EOM are normal. No scleral icterus.   Neck: Normal range of motion. Neck supple. Normal carotid pulses present. Carotid bruit is not present. No tracheal deviation present. No thyromegaly present.   Cardiovascular: Regular rhythm, S1 normal, S2 normal and intact distal pulses. Exam reveals no gallop and no friction rub.   No murmur heard.  Pulses:       Carotid pulses are 2+ on the right side, and 2+ on the left side.  Pulmonary/Chest: Effort normal and breath sounds normal. She has no wheezes. She has no rhonchi. She has no rales. Chest wall is not dull to percussion.   Abdominal: Soft. Normal appearance and bowel sounds are normal. She exhibits no abdominal bruit. There is no hepatosplenomegaly. There is no tenderness. There is no rebound and no guarding.   Musculoskeletal: She exhibits no edema.   Lymphadenopathy:     She has no cervical adenopathy.   Neurological: She is alert and oriented to person, place, and time. No cranial nerve deficit. Coordination normal.   Skin: Skin is warm and dry.   Psychiatric: She has a normal " mood and affect.   Nursing note and vitals reviewed.        Assessment/Plan   Ivonne was seen today for annual exam.    Diagnoses and all orders for this visit:    Annual physical exam    Urinary symptom or sign  -     Urinalysis With Culture If Indicated - Urine, Clean Catch    Anxiety      Encouraged prudent diet, regular exercise, maintenance of healthy weight, good sleep habits,  avoidance of tobacco products, excessive alcohol.  Encouraged her to increase water intake.  She reports only drinking 2 glasses a day although they are large glasses.  She reports she does not really need to get 30 tablets of Xanax at a time.  She does not take it every night.  Advised her we could reduce her prescription to #15 when its next refilled.

## 2020-03-19 LAB
APPEARANCE UR: CLEAR
BACTERIA #/AREA URNS HPF: NORMAL /HPF
BACTERIA UR CULT: NORMAL
BACTERIA UR CULT: NORMAL
BILIRUB UR QL STRIP: NEGATIVE
COLOR UR: YELLOW
EPI CELLS #/AREA URNS HPF: NORMAL /HPF (ref 0–10)
GLUCOSE UR QL: NEGATIVE
HGB UR QL STRIP: NEGATIVE
KETONES UR QL STRIP: NEGATIVE
LEUKOCYTE ESTERASE UR QL STRIP: ABNORMAL
MICRO URNS: ABNORMAL
MUCOUS THREADS URNS QL MICRO: PRESENT /HPF
NITRITE UR QL STRIP: NEGATIVE
PH UR STRIP: 5.5 [PH] (ref 5–7.5)
PROT UR QL STRIP: NEGATIVE
RBC #/AREA URNS HPF: NORMAL /HPF (ref 0–2)
SP GR UR: 1.01 (ref 1–1.03)
URINALYSIS REFLEX: ABNORMAL
UROBILINOGEN UR STRIP-MCNC: 0.2 MG/DL (ref 0.2–1)
WBC #/AREA URNS HPF: NORMAL /HPF (ref 0–5)

## 2020-07-13 DIAGNOSIS — F41.9 ANXIETY: Primary | ICD-10-CM

## 2020-07-14 RX ORDER — ALPRAZOLAM 0.5 MG/1
TABLET ORAL
Qty: 30 TABLET | Refills: 0 | Status: SHIPPED | OUTPATIENT
Start: 2020-07-14 | End: 2020-09-24

## 2020-07-27 RX ORDER — LEVOTHYROXINE SODIUM 100 MCG
TABLET ORAL
Qty: 90 TABLET | Refills: 1 | Status: SHIPPED | OUTPATIENT
Start: 2020-07-27 | End: 2020-12-18

## 2020-09-21 DIAGNOSIS — Z78.0 POST-MENOPAUSAL: Primary | ICD-10-CM

## 2020-09-22 ENCOUNTER — OFFICE VISIT (OUTPATIENT)
Dept: INTERNAL MEDICINE | Facility: CLINIC | Age: 60
End: 2020-09-22

## 2020-09-22 ENCOUNTER — CLINICAL SUPPORT (OUTPATIENT)
Dept: INTERNAL MEDICINE | Facility: CLINIC | Age: 60
End: 2020-09-22

## 2020-09-22 VITALS
BODY MASS INDEX: 31.22 KG/M2 | WEIGHT: 206 LBS | HEART RATE: 69 BPM | DIASTOLIC BLOOD PRESSURE: 86 MMHG | OXYGEN SATURATION: 97 % | SYSTOLIC BLOOD PRESSURE: 133 MMHG | HEIGHT: 68 IN | RESPIRATION RATE: 16 BRPM | TEMPERATURE: 98.5 F

## 2020-09-22 DIAGNOSIS — E03.9 HYPOTHYROIDISM, ADULT: Primary | ICD-10-CM

## 2020-09-22 DIAGNOSIS — F41.9 ANXIETY: ICD-10-CM

## 2020-09-22 DIAGNOSIS — Z23 NEED FOR INFLUENZA VACCINATION: ICD-10-CM

## 2020-09-22 DIAGNOSIS — Z78.0 POST-MENOPAUSAL: ICD-10-CM

## 2020-09-22 PROCEDURE — 77080 DXA BONE DENSITY AXIAL: CPT | Performed by: INTERNAL MEDICINE

## 2020-09-22 PROCEDURE — 99213 OFFICE O/P EST LOW 20 MIN: CPT | Performed by: INTERNAL MEDICINE

## 2020-09-22 PROCEDURE — 90686 IIV4 VACC NO PRSV 0.5 ML IM: CPT | Performed by: INTERNAL MEDICINE

## 2020-09-22 PROCEDURE — 90471 IMMUNIZATION ADMIN: CPT | Performed by: INTERNAL MEDICINE

## 2020-09-22 NOTE — PROGRESS NOTES
"Chief Complaint   Patient presents with   • Hypothyroidism   • Anxiety   • Insomnia       Subjective   Ivonne Camara is a 60 y.o. female.     History of Present Illness        Hypothyroidism:  This is a chronic problem.  Symptoms do not include unexpected weight changes, changes in heat/cold tolerance, fatigue, weakness, constipation, goiter, appetite change, change in hair/skin.  Current treatment: Levothyroxine.  By report, there is good compliance with treatment, good tolerance of treatment and good symptom control.     Anxiety:  This is a chronic problem.  Symptoms include anxiety, insomnia.  \"My mind will not slow down at night.  I'm always thinking about things\"  Alprazolam helps. She only has to take 1/2 tablet when she has trouble sleeping.  She does not have to do this every night. She also reports a tendency to OCD.        The following portions of the patient's history were reviewed and updated as appropriate: allergies, current medications, past family history, past medical history, past social history, past surgical history and problem list.    Review of Systems   Constitutional: Negative for appetite change and fatigue.   HENT: Negative for nosebleeds.    Eyes: Negative for blurred vision and double vision.   Respiratory: Negative for cough and shortness of breath.    Cardiovascular: Negative for chest pain, palpitations and leg swelling.   Endocrine: Negative for cold intolerance and heat intolerance.         Current Outpatient Medications:   •  ALPRAZolam (XANAX) 0.5 MG tablet, TAKE ONE TABLET BY MOUTH AT NIGHT AS NEEDED FOR ANXEITY, Disp: 30 tablet, Rfl: 0  •  SYNTHROID 100 MCG tablet, TAKE 1 TABLET DAILY, Disp: 90 tablet, Rfl: 1        Objective     /86 (BP Location: Right arm, Patient Position: Sitting, Cuff Size: Adult)   Pulse 69   Temp 98.5 °F (36.9 °C) (Oral)   Resp 16   Ht 172.7 cm (68\")   Wt 93.4 kg (206 lb)   SpO2 97%   BMI 31.32 kg/m²     Physical Exam  Vitals signs and " nursing note reviewed.   Constitutional:       General: She is not in acute distress.     Appearance: She is well-developed.   Neck:      Vascular: Normal carotid pulses. No carotid bruit.   Cardiovascular:      Rate and Rhythm: Regular rhythm.      Pulses:           Carotid pulses are 2+ on the right side and 2+ on the left side.     Heart sounds: S1 normal and S2 normal. No murmur. No friction rub. No gallop.    Pulmonary:      Effort: Pulmonary effort is normal.      Breath sounds: Normal breath sounds. No wheezing, rhonchi or rales.   Skin:     General: Skin is warm and dry.   Neurological:      Mental Status: She is alert and oriented to person, place, and time.           Assessment/Plan   Ivonne was seen today for hypothyroidism, anxiety and insomnia.    Diagnoses and all orders for this visit:    Hypothyroidism, adult    Anxiety    Need for influenza vaccination  -     FluLaval Quad >6 Months (2342-9730)      She will continue current medications.  She will continue to limit use of alprazolam.

## 2020-09-24 DIAGNOSIS — F41.9 ANXIETY: ICD-10-CM

## 2020-09-24 RX ORDER — ALPRAZOLAM 0.5 MG/1
TABLET ORAL
Qty: 30 TABLET | Refills: 0 | Status: SHIPPED | OUTPATIENT
Start: 2020-09-24 | End: 2020-11-19

## 2020-10-20 ENCOUNTER — APPOINTMENT (OUTPATIENT)
Dept: WOMENS IMAGING | Facility: HOSPITAL | Age: 60
End: 2020-10-20

## 2020-10-20 PROCEDURE — 77067 SCR MAMMO BI INCL CAD: CPT | Performed by: RADIOLOGY

## 2020-10-20 PROCEDURE — 77063 BREAST TOMOSYNTHESIS BI: CPT | Performed by: RADIOLOGY

## 2020-11-19 DIAGNOSIS — F41.9 ANXIETY: ICD-10-CM

## 2020-11-19 RX ORDER — ALPRAZOLAM 0.5 MG/1
TABLET ORAL
Qty: 15 TABLET | Refills: 2 | Status: SHIPPED | OUTPATIENT
Start: 2020-11-19 | End: 2021-04-09

## 2020-12-18 RX ORDER — LEVOTHYROXINE SODIUM 100 MCG
TABLET ORAL
Qty: 90 TABLET | Refills: 1 | Status: SHIPPED | OUTPATIENT
Start: 2020-12-18 | End: 2021-07-12

## 2021-03-15 ENCOUNTER — OFFICE VISIT (OUTPATIENT)
Dept: INTERNAL MEDICINE | Facility: CLINIC | Age: 61
End: 2021-03-15

## 2021-03-15 ENCOUNTER — TELEPHONE (OUTPATIENT)
Dept: INTERNAL MEDICINE | Facility: CLINIC | Age: 61
End: 2021-03-15

## 2021-03-15 DIAGNOSIS — R19.7 DIARRHEA, UNSPECIFIED TYPE: Primary | ICD-10-CM

## 2021-03-15 PROCEDURE — 99442 PR PHYS/QHP TELEPHONE EVALUATION 11-20 MIN: CPT | Performed by: NURSE PRACTITIONER

## 2021-03-15 RX ORDER — CHLORAL HYDRATE 500 MG
CAPSULE ORAL
COMMUNITY

## 2021-03-15 RX ORDER — B-COMPLEX WITH VITAMIN C
TABLET ORAL
COMMUNITY
End: 2022-09-26

## 2021-03-15 NOTE — PROGRESS NOTES
Subjective     Ivonne Camara is a 60 y.o. female.         You have chosen to receive care through a telephone visit. Do you consent to use a telephone visit for your medical care today? YES    She presents with a nearly 3-week history of diarrhea.  Patient reports that it started after having out of date steroid cream.  Diarrhea at first was frequent daily and now is about once every other day.  Patient has taken Imodium which does help.  She denies fever, blood in stool, abdominal pain (other than cramping at the time of a BM).      Diarrhea   This is a new problem. The current episode started 1 to 4 weeks ago. The problem has been gradually improving. Associated symptoms include abdominal pain (cramping with BM). Pertinent negatives include no arthralgias, bloating, coughing, fever, increased  flatus, myalgias, sweats or vomiting. She has tried anti-motility drug for the symptoms. The treatment provided mild relief. There is no history of bowel resection, inflammatory bowel disease, irritable bowel syndrome, malabsorption, a recent abdominal surgery or short gut syndrome.        The following portions of the patient's history were reviewed and updated as appropriate: allergies, current medications, past social history and problem list.    Past Medical History:   Diagnosis Date   • H/O mammogram    • Hypercholesterolemia    • Hypothyroidism    • Renal calculi          Current Outpatient Medications:   •  Omega-3 Fatty Acids (fish oil) 1000 MG capsule capsule, Take  by mouth Daily With Breakfast., Disp: , Rfl:   •  ALPRAZolam (XANAX) 0.5 MG tablet, TAKE ONE TABLET BY MOUTH AT NIGHT AS NEEDED FOR ANXEITY, Disp: 15 tablet, Rfl: 2  •  Synthroid 100 MCG tablet, TAKE 1 TABLET DAILY, Disp: 90 tablet, Rfl: 1    No Known Allergies    Review of Systems   Constitutional: Negative for fatigue and fever.   Respiratory: Negative for cough and shortness of breath.    Cardiovascular: Negative for chest pain.   Gastrointestinal:  Positive for abdominal pain (cramping with BM) and diarrhea. Negative for bloating, blood in stool, flatus, nausea and vomiting.   Musculoskeletal: Negative for arthralgias and myalgias.       Objective     There were no vitals taken for this visit.  Wt Readings from Last 3 Encounters:   09/22/20 93.4 kg (206 lb)   03/17/20 91.1 kg (200 lb 12.8 oz)   12/27/19 89.4 kg (197 lb)     Temp Readings from Last 3 Encounters:   09/22/20 98.5 °F (36.9 °C) (Oral)   12/27/19 98.9 °F (37.2 °C)     BP Readings from Last 3 Encounters:   09/22/20 133/86   03/17/20 100/82   12/27/19 104/80     Pulse Readings from Last 3 Encounters:   09/22/20 69   03/17/20 64   12/27/19 87       Physical Exam  Neurological:      Mental Status: She is alert and oriented to person, place, and time.   Psychiatric:         Mood and Affect: Mood normal.         Behavior: Behavior normal.         Thought Content: Thought content normal.         Judgment: Judgment normal.         Assessment/Plan     Diagnoses and all orders for this visit:    1. Diarrhea, unspecified type (Primary)  Comments:  Once every other day, I recommend a daily probiotic-align or Florastor    Return for worsening of sx.    I spent 14 min on phone with pt.

## 2021-03-15 NOTE — TELEPHONE ENCOUNTER
Caller: Ivonne Camara    Relationship: Self    Best call back number:  [phone]     What medication are you requesting:  Something to help with diarrhea    What are your current symptoms: no nausea , not throwing and stomach cramping     How long have you been experiencing symptoms:  A couple of weeks     Have you had these symptoms before:    [] Yes  [x] No    Have you been treated for these symptoms before:   [] Yes  [x] No    If a prescription is needed, what is your preferred pharmacy and phone number:  [facility]/pharmacy #[id number] - [place], [place] - [address] [address] [facility] AT [facility] - [phone]  - [phone]   [phone]    Additional notes: Patient took some bites of spoiled sour cream , patient has been self treating with Imodium

## 2021-03-19 DIAGNOSIS — Z00.00 ANNUAL PHYSICAL EXAM: Primary | ICD-10-CM

## 2021-03-29 ENCOUNTER — OFFICE VISIT (OUTPATIENT)
Dept: INTERNAL MEDICINE | Facility: CLINIC | Age: 61
End: 2021-03-29

## 2021-03-29 VITALS
TEMPERATURE: 97.1 F | BODY MASS INDEX: 31.37 KG/M2 | SYSTOLIC BLOOD PRESSURE: 126 MMHG | HEIGHT: 68 IN | HEART RATE: 79 BPM | DIASTOLIC BLOOD PRESSURE: 64 MMHG | OXYGEN SATURATION: 98 % | WEIGHT: 207 LBS

## 2021-03-29 DIAGNOSIS — Z00.00 ANNUAL PHYSICAL EXAM: Primary | ICD-10-CM

## 2021-03-29 PROCEDURE — 99396 PREV VISIT EST AGE 40-64: CPT | Performed by: INTERNAL MEDICINE

## 2021-03-29 NOTE — PROGRESS NOTES
"Chief Complaint   Patient presents with   • Annual Exam       Subjective   Ivonne Camara is a 60 y.o. female.     History of Present Illness     She is here for annual examination.    She generally feels healthy.  She has had some problems with diarrhea recently.  Her appetite is good.    She tries to follow a balanced diet.    She sleeps well.    Her energy is good.    She exercises regularly.    She is up to date on dental and eye exams.   She had diarrhea after eating outdated sourcream.  She took imodium, then started Align.  It is getting better.  She was having some cramping in lower abdomen but that is better.                The following portions of the patient's history were reviewed and updated as appropriate: allergies, current medications, past family history, past medical history, past social history, past surgical history and problem list.    Review of Systems   Constitutional: Negative for appetite change and fever.   HENT: Negative for nosebleeds, sore throat and trouble swallowing.    Eyes: Negative for blurred vision and double vision.   Respiratory: Negative for cough and shortness of breath.    Cardiovascular: Negative for chest pain, palpitations and leg swelling.   Gastrointestinal: Positive for diarrhea (improving). Negative for abdominal pain, constipation, nausea and vomiting.   Endocrine: Negative for cold intolerance, heat intolerance, polydipsia and polyuria.   Genitourinary: Negative for breast discharge, breast lump, dysuria and frequency.   Hematological: Negative for adenopathy. Does not bruise/bleed easily.   Psychiatric/Behavioral: Negative for sleep disturbance (sometimes has to take 1/2 xanax) and depressed mood. The patient is nervous/anxious (\"worries a lot\").            Current Outpatient Medications:   •  ALPRAZolam (XANAX) 0.5 MG tablet, TAKE ONE TABLET BY MOUTH AT NIGHT AS NEEDED FOR ANXEITY, Disp: 15 tablet, Rfl: 2  •  Calcium Carb-Cholecalciferol 1000-800 MG-UNIT " "tablet, Take  by mouth., Disp: , Rfl:   •  Omega-3 Fatty Acids (fish oil) 1000 MG capsule capsule, Take  by mouth Daily With Breakfast., Disp: , Rfl:   •  Synthroid 100 MCG tablet, TAKE 1 TABLET DAILY, Disp: 90 tablet, Rfl: 1  •  VITAMIN D PO, Take 1,000 mg by mouth Daily., Disp: , Rfl:   •  Zinc 100 MG tablet, Take  by mouth., Disp: , Rfl:         Objective     /64   Pulse 79   Temp 97.1 °F (36.2 °C)   Ht 172.7 cm (68\")   Wt 93.9 kg (207 lb)   SpO2 98%   BMI 31.47 kg/m²     Physical Exam  Vitals and nursing note reviewed.   Constitutional:       General: She is not in acute distress.     Appearance: Normal appearance. She is well-developed.   HENT:      Right Ear: Tympanic membrane and ear canal normal.      Left Ear: Tympanic membrane and ear canal normal.      Nose:      Right Sinus: No maxillary sinus tenderness or frontal sinus tenderness.      Left Sinus: No maxillary sinus tenderness or frontal sinus tenderness.   Eyes:      General: No scleral icterus.     Conjunctiva/sclera: Conjunctivae normal.      Pupils: Pupils are equal, round, and reactive to light.   Neck:      Thyroid: No thyromegaly.      Vascular: Normal carotid pulses. No carotid bruit.      Trachea: No tracheal deviation.   Cardiovascular:      Rate and Rhythm: Regular rhythm.      Pulses:           Carotid pulses are 2+ on the right side and 2+ on the left side.     Heart sounds: S1 normal and S2 normal. No murmur heard.   No friction rub. No gallop.    Pulmonary:      Effort: Pulmonary effort is normal.      Breath sounds: Normal breath sounds. No wheezing, rhonchi or rales.   Abdominal:      General: Bowel sounds are normal. There is no abdominal bruit.      Palpations: Abdomen is soft.      Tenderness: There is no abdominal tenderness. There is no guarding or rebound.   Musculoskeletal:      Cervical back: Normal range of motion and neck supple.   Lymphadenopathy:      Cervical: No cervical adenopathy.   Skin:     General: Skin " is warm and dry.   Neurological:      Mental Status: She is alert and oriented to person, place, and time.      Cranial Nerves: No cranial nerve deficit.      Coordination: Coordination normal.           Assessment/Plan   Diagnoses and all orders for this visit:    1. Annual physical exam (Primary)      Encouraged prudent diet, regular exercise, maintenance of healthy weight, good sleep habits,  avoidance of tobacco products, excessive alcohol. In regard to COVID pandemic, encouraged social distancing, wearing a mask while out, frequent handwashing. Reviewed recent lab. Triglycerides high. Glucose high. She will try to control this with diet and exercise.

## 2021-04-09 DIAGNOSIS — F41.9 ANXIETY: ICD-10-CM

## 2021-04-09 RX ORDER — ALPRAZOLAM 0.5 MG/1
TABLET ORAL
Qty: 15 TABLET | Refills: 2 | Status: SHIPPED | OUTPATIENT
Start: 2021-04-09 | End: 2021-09-01

## 2021-04-09 NOTE — TELEPHONE ENCOUNTER
No contract on file pt needs to sign one at her next visit  Last OV 3/29/21  Next Scheduled OV 10/4/21

## 2021-06-30 ENCOUNTER — TELEPHONE (OUTPATIENT)
Dept: INTERNAL MEDICINE | Facility: CLINIC | Age: 61
End: 2021-06-30

## 2021-06-30 RX ORDER — ACYCLOVIR 50 MG/G
OINTMENT TOPICAL
Qty: 15 G | Refills: 0 | Status: SHIPPED | OUTPATIENT
Start: 2021-06-30 | End: 2021-11-02

## 2021-06-30 NOTE — TELEPHONE ENCOUNTER
Caller: Jax Ivonne    Relationship: Self    Best call back number:[phone]      What medication are you requesting: ZOBERAX 5% OINTMENT     What are your current symptoms: FEVER BLISTERS     How long have you been experiencing symptoms: COUPLE DAYS     Have you had these symptoms before:    [x] Yes  [] No    Have you been treated for these symptoms before:   [x] Yes  [] No    If a prescription is needed, what is your preferred pharmacy and phone number:      [facility]/pharmacy #[id number] - [place], [place] - [address] AT [facility] [phone] [facility] [phone] FX       Additional notes: PATIENT STATES SHE HAS BEEN TREATED FOR FEVER BLISTERS BEFORE, BUT THE RX SHE HAS  IN  AND PATIENT WOULD LIKE A NEW RX

## 2021-07-12 RX ORDER — LEVOTHYROXINE SODIUM 100 MCG
TABLET ORAL
Qty: 90 TABLET | Refills: 1 | Status: SHIPPED | OUTPATIENT
Start: 2021-07-12 | End: 2021-12-28

## 2021-09-01 DIAGNOSIS — F41.9 ANXIETY: ICD-10-CM

## 2021-09-01 RX ORDER — ALPRAZOLAM 0.5 MG/1
TABLET ORAL
Qty: 15 TABLET | Refills: 2 | Status: SHIPPED | OUTPATIENT
Start: 2021-09-01 | End: 2021-11-02 | Stop reason: SDUPTHER

## 2021-10-25 ENCOUNTER — APPOINTMENT (OUTPATIENT)
Dept: WOMENS IMAGING | Facility: HOSPITAL | Age: 61
End: 2021-10-25

## 2021-10-25 PROCEDURE — 77063 BREAST TOMOSYNTHESIS BI: CPT | Performed by: RADIOLOGY

## 2021-10-25 PROCEDURE — 77067 SCR MAMMO BI INCL CAD: CPT | Performed by: RADIOLOGY

## 2021-11-02 ENCOUNTER — OFFICE VISIT (OUTPATIENT)
Dept: INTERNAL MEDICINE | Facility: CLINIC | Age: 61
End: 2021-11-02

## 2021-11-02 VITALS
BODY MASS INDEX: 30.89 KG/M2 | HEART RATE: 90 BPM | WEIGHT: 203.8 LBS | TEMPERATURE: 97.3 F | RESPIRATION RATE: 16 BRPM | HEIGHT: 68 IN | OXYGEN SATURATION: 97 % | SYSTOLIC BLOOD PRESSURE: 124 MMHG | DIASTOLIC BLOOD PRESSURE: 88 MMHG

## 2021-11-02 DIAGNOSIS — E78.5 HYPERLIPIDEMIA, UNSPECIFIED HYPERLIPIDEMIA TYPE: Primary | ICD-10-CM

## 2021-11-02 DIAGNOSIS — E03.9 HYPOTHYROIDISM, ADULT: ICD-10-CM

## 2021-11-02 DIAGNOSIS — F41.9 ANXIETY: ICD-10-CM

## 2021-11-02 PROCEDURE — 99214 OFFICE O/P EST MOD 30 MIN: CPT | Performed by: INTERNAL MEDICINE

## 2021-11-02 RX ORDER — ALPRAZOLAM 0.5 MG/1
0.5 TABLET ORAL NIGHTLY PRN
Qty: 15 TABLET | Refills: 2 | Status: SHIPPED | OUTPATIENT
Start: 2021-11-02 | End: 2022-07-12 | Stop reason: SDUPTHER

## 2021-11-02 NOTE — PROGRESS NOTES
Chief Complaint   Patient presents with   • Anxiety     8 month follow-up   • Hyperlipidemia   • Hypothyroidism       Subjective   Ivonne Camara is a 61 y.o. female.     Anxiety  Presents for follow-up visit. Symptoms include insomnia and nervous/anxious behavior. Patient reports no chest pain, palpitations or shortness of breath. Symptoms occur most days. The severity of symptoms is moderate. The quality of sleep is good (with medication).     Compliance with medications is %.   Hyperlipidemia  This is a chronic problem. Recent lipid tests were reviewed and are variable. Exacerbating diseases include hypothyroidism. Pertinent negatives include no chest pain or shortness of breath. Current antihyperlipidemic treatment includes herbal therapy and diet change. The current treatment provides significant improvement of lipids. There are no compliance problems.    Hypothyroidism  This is a chronic problem. Pertinent negatives include no chest pain or coughing. Associated symptoms comments: No changes in heat/cold tolerance, no change hair/skin.  No fatigue. . Treatments tried: Synthroid. The treatment provided significant relief.      She reports she has started Delta 8 CBD product. She takes one gummy in the evening. It helps her sleep.       The following portions of the patient's history were reviewed and updated as appropriate: allergies, current medications, past family history, past medical history, past social history, past surgical history and problem list.      Current Outpatient Medications:   •  ALPRAZolam (XANAX) 0.5 MG tablet, TAKE ONE TABLET BY MOUTH AT NIGHT AS NEEDED FOR ANXIETY, Disp: 15 tablet, Rfl: 2  •  Calcium Carb-Cholecalciferol 1000-800 MG-UNIT tablet, Take  by mouth., Disp: , Rfl:   •  Omega-3 Fatty Acids (fish oil) 1000 MG capsule capsule, Take  by mouth Daily With Breakfast., Disp: , Rfl:   •  Synthroid 100 MCG tablet, TAKE 1 TABLET DAILY, Disp: 90 tablet, Rfl: 1  •  VITAMIN D PO, Take  "1,000 mg by mouth Daily., Disp: , Rfl:   •  Zinc 100 MG tablet, Take  by mouth., Disp: , Rfl:            Review of Systems   Respiratory: Negative for cough and shortness of breath.    Cardiovascular: Negative for chest pain, palpitations and leg swelling.   Endocrine: Negative for cold intolerance and heat intolerance.   Psychiatric/Behavioral: The patient is nervous/anxious and has insomnia.            Objective     /88 (BP Location: Right arm, Patient Position: Sitting, Cuff Size: Adult)   Pulse 90   Temp 97.3 °F (36.3 °C) (Temporal)   Resp 16   Ht 172.7 cm (68\")   Wt 92.4 kg (203 lb 12.8 oz)   LMP  (LMP Unknown)   SpO2 97%   Breastfeeding No   BMI 30.99 kg/m²       Physical Exam  Vitals and nursing note reviewed.   Constitutional:       General: She is not in acute distress.     Appearance: She is well-developed.   Neck:      Vascular: Normal carotid pulses. No carotid bruit.   Cardiovascular:      Rate and Rhythm: Regular rhythm.      Pulses:           Carotid pulses are 2+ on the right side and 2+ on the left side.     Heart sounds: S1 normal and S2 normal. No murmur heard.  No friction rub. No gallop.    Pulmonary:      Effort: Pulmonary effort is normal.      Breath sounds: Normal breath sounds. No wheezing, rhonchi or rales.   Skin:     General: Skin is warm and dry.   Neurological:      Mental Status: She is alert and oriented to person, place, and time.       Lab Results   Component Value Date    CHOL 181 03/10/2020    CHLPL 184 03/22/2021    TRIG 173 (H) 03/22/2021    HDL 51 03/22/2021     (H) 03/22/2021      Lab Results   Component Value Date    GLUCOSE 110 (H) 03/22/2021    BUN 12 03/22/2021    CREATININE 0.95 03/22/2021    EGFRIFNONA 60 (L) 03/22/2021    EGFRIFAFRI 73 03/22/2021    BCR 12.6 03/22/2021    K 4.5 03/22/2021    CO2 29.4 (H) 03/22/2021    CALCIUM 9.9 03/22/2021    PROTENTOTREF 6.8 03/22/2021    ALBUMIN 4.10 03/22/2021    LABIL2 1.5 03/22/2021    AST 16 03/22/2021    " ALT 21 03/22/2021      Lab Results   Component Value Date    TSH 1.790 03/22/2021        Assessment/Plan   Diagnoses and all orders for this visit:    1. Hyperlipidemia, unspecified hyperlipidemia type (Primary)    2. Hypothyroidism, adult    3. Anxiety      She will continue same medications: Omega-3 fatty acids for hyperlipidemia, Synthroid for hypothyroidism, prn alprazolam to help with anxiety and insomnia.  Advised OK to continue CBD product.   Encouraged prudent diet and regular exercise.                    Return for follow up as scheduled.

## 2021-12-28 RX ORDER — LEVOTHYROXINE SODIUM 100 MCG
TABLET ORAL
Qty: 90 TABLET | Refills: 3 | Status: SHIPPED | OUTPATIENT
Start: 2021-12-28 | End: 2022-08-05 | Stop reason: SDUPTHER

## 2022-04-04 ENCOUNTER — OFFICE VISIT (OUTPATIENT)
Dept: INTERNAL MEDICINE | Facility: CLINIC | Age: 62
End: 2022-04-04

## 2022-04-04 VITALS
BODY MASS INDEX: 28.64 KG/M2 | RESPIRATION RATE: 17 BRPM | TEMPERATURE: 98 F | WEIGHT: 189 LBS | OXYGEN SATURATION: 96 % | SYSTOLIC BLOOD PRESSURE: 118 MMHG | HEIGHT: 68 IN | DIASTOLIC BLOOD PRESSURE: 74 MMHG | HEART RATE: 64 BPM

## 2022-04-04 DIAGNOSIS — N18.2 CKD (CHRONIC KIDNEY DISEASE) STAGE 2, GFR 60-89 ML/MIN: ICD-10-CM

## 2022-04-04 DIAGNOSIS — Z00.00 ROUTINE ADULT HEALTH MAINTENANCE: Primary | ICD-10-CM

## 2022-04-04 DIAGNOSIS — F41.9 ANXIETY: ICD-10-CM

## 2022-04-04 DIAGNOSIS — E78.5 HYPERLIPIDEMIA, UNSPECIFIED HYPERLIPIDEMIA TYPE: ICD-10-CM

## 2022-04-04 DIAGNOSIS — E03.9 HYPOTHYROIDISM, ADULT: ICD-10-CM

## 2022-04-04 PROCEDURE — 99396 PREV VISIT EST AGE 40-64: CPT | Performed by: NURSE PRACTITIONER

## 2022-04-04 RX ORDER — MAGNESIUM GLUCONATE 27 MG(500)
27 TABLET ORAL 2 TIMES DAILY
COMMUNITY
End: 2022-09-26

## 2022-04-04 RX ORDER — MULTIVIT WITH MINERALS/LUTEIN
1000 TABLET ORAL DAILY
COMMUNITY
End: 2022-09-26

## 2022-04-04 NOTE — PROGRESS NOTES
"Chief Complaint  Follow-up (Pt presents here today for a follow up for health maintenance. ) and Hypothyroidism    Subjective          Ivonne Camara presents to [facility]  History of Present Illness  This is a 60 y/o female presenting to office for annual blood work and for hypothyroidism f/u. Patient is currently . Patient currently works for [employer].     Patient denies any tobacco use. Patient reports occasional weekend alcohol use.     Patient continues on synthroid for hypothyroidism. Patient is due for blood work for this today.     Patient reports she had cologuard completed in 2018. Patient is interested in colonoscopy screening. Patient would like order for this today.     Patient reports regular exercise and attempting to follow healthy diet choices. Patient does have history of HLD. Not currently on medication for this.     Patient continues on xanax as needed for anxiety. Patient reports sometimes she needs to use this medication at night-- specifically with poor weather which increases anxiety. Patient denies any suicidal ideation.     Objective   Vital Signs:   /74 (BP Location: Right arm, Patient Position: Sitting, Cuff Size: Large Adult)   Pulse 64   Temp 98 °F (36.7 °C)   Resp 17   Ht 172.7 cm (68\")   Wt 85.7 kg (189 lb)   SpO2 96%   BMI 28.74 kg/m²     BMI is above normal parameters. Recommendations: educational material discussed/shared in after visit summary       Physical Exam  Vitals and nursing note reviewed.   Constitutional:       Appearance: Normal appearance. She is normal weight.   HENT:      Head: Normocephalic and atraumatic.   Eyes:      Extraocular Movements: Extraocular movements intact.      Conjunctiva/sclera: Conjunctivae normal.      Pupils: Pupils are equal, round, and reactive to light.   Cardiovascular:      Rate and Rhythm: Normal rate and regular rhythm.      Pulses: Normal pulses.      Heart sounds: Normal heart sounds. No " murmur heard.    No friction rub. No gallop.   Pulmonary:      Effort: Pulmonary effort is normal. No respiratory distress.      Breath sounds: Normal breath sounds. No stridor. No wheezing, rhonchi or rales.   Abdominal:      General: Bowel sounds are normal. There is no distension.      Palpations: Abdomen is soft.      Tenderness: There is no abdominal tenderness.   Musculoskeletal:         General: No swelling or deformity. Normal range of motion.      Cervical back: Normal range of motion and neck supple.   Skin:     General: Skin is warm and dry.      Coloration: Skin is not jaundiced.      Findings: No bruising.   Neurological:      General: No focal deficit present.      Mental Status: She is alert and oriented to person, place, and time. Mental status is at baseline.      Motor: No weakness.   Psychiatric:         Mood and Affect: Mood normal.         Behavior: Behavior normal.         Thought Content: Thought content normal.         Judgment: Judgment normal.        Result Review :            Assessment and Plan    Diagnoses and all orders for this visit:    1. Routine adult health maintenance (Primary)  Assessment & Plan:  Encouraged 150-300 minutes weekly exercise.   Continue with healthy diet choices according to [employer] food guidance.   Labs ordered.   Pap smear/mammogram UTD.   Continue with monthly self breast examinations.  Deferred covid 19 booster to retail pharmacy due to supply.   Colonoscopy ordered.   Anticipatory guidance given regarding health prevention/wellness, diet/exercise, tobacco/alcohol/drug education, exercise and wellbeing, covid 19 guidance, and sexual health/STD education.       Orders:  -     Ambulatory Referral For Screening Colonoscopy  -     CBC & Differential  -     Comprehensive metabolic panel  -     Hemoglobin A1c    2. Hypothyroidism, adult  -     TSH Rfx On Abnormal To Free T4    3. Hyperlipidemia, unspecified hyperlipidemia type  Assessment & Plan:  Encouraged low  saturated fat diet and continued exercise.     Orders:  -     Lipid panel    4. CKD (chronic kidney disease) stage 2, GFR 60-89 ml/min  Assessment & Plan:  Labs today.     Orders:  -     Comprehensive metabolic panel    5. Anxiety  Assessment & Plan:  Continues on xanax PRN.         Follow Up   Return for Next scheduled follow up 7/12/22.  Patient was given instructions and counseling regarding her condition or for health maintenance advice. Please see specific information pulled into the AVS if appropriate.

## 2022-04-04 NOTE — ASSESSMENT & PLAN NOTE
Encouraged 150-300 minutes weekly exercise.   Continue with healthy diet choices according to [employer] food guidance.   Labs ordered.   Pap smear/mammogram UTD.   Continue with monthly self breast examinations.  Deferred covid 19 booster to retail pharmacy due to supply.   Colonoscopy ordered.   Anticipatory guidance given regarding health prevention/wellness, diet/exercise, tobacco/alcohol/drug education, exercise and wellbeing, covid 19 guidance, and sexual health/STD education.

## 2022-04-05 ENCOUNTER — PRE-PROCEDURE SCREENING (OUTPATIENT)
Dept: GASTROENTEROLOGY | Facility: CLINIC | Age: 62
End: 2022-04-05

## 2022-04-05 LAB
ALBUMIN SERPL-MCNC: 4.4 G/DL (ref 3.8–4.9)
ALBUMIN/GLOB SERPL: 1.8 {RATIO} (ref 1.2–2.2)
ALP SERPL-CCNC: 72 IU/L (ref 44–121)
ALT SERPL-CCNC: 14 IU/L (ref 0–32)
AST SERPL-CCNC: 15 IU/L (ref 0–40)
BASOPHILS # BLD AUTO: 0.1 X10E3/UL (ref 0–0.2)
BASOPHILS NFR BLD AUTO: 1 %
BILIRUB SERPL-MCNC: 0.7 MG/DL (ref 0–1.2)
BUN SERPL-MCNC: 14 MG/DL (ref 8–27)
BUN/CREAT SERPL: 16 (ref 12–28)
CALCIUM SERPL-MCNC: 9.9 MG/DL (ref 8.7–10.3)
CHLORIDE SERPL-SCNC: 102 MMOL/L (ref 96–106)
CHOLEST SERPL-MCNC: 186 MG/DL (ref 100–199)
CO2 SERPL-SCNC: 24 MMOL/L (ref 20–29)
CREAT SERPL-MCNC: 0.9 MG/DL (ref 0.57–1)
EGFRCR SERPLBLD CKD-EPI 2021: 73 ML/MIN/1.73
EOSINOPHIL # BLD AUTO: 0.1 X10E3/UL (ref 0–0.4)
EOSINOPHIL NFR BLD AUTO: 1 %
ERYTHROCYTE [DISTWIDTH] IN BLOOD BY AUTOMATED COUNT: 12.2 % (ref 11.7–15.4)
GLOBULIN SER CALC-MCNC: 2.4 G/DL (ref 1.5–4.5)
GLUCOSE SERPL-MCNC: 99 MG/DL (ref 65–99)
HBA1C MFR BLD: 5.6 % (ref 4.8–5.6)
HCT VFR BLD AUTO: 41.6 % (ref 34–46.6)
HDLC SERPL-MCNC: 52 MG/DL
HGB BLD-MCNC: 13.8 G/DL (ref 11.1–15.9)
IMM GRANULOCYTES # BLD AUTO: 0 X10E3/UL (ref 0–0.1)
IMM GRANULOCYTES NFR BLD AUTO: 0 %
LDLC SERPL CALC-MCNC: 105 MG/DL (ref 0–99)
LYMPHOCYTES # BLD AUTO: 2.6 X10E3/UL (ref 0.7–3.1)
LYMPHOCYTES NFR BLD AUTO: 38 %
MCH RBC QN AUTO: 29.7 PG (ref 26.6–33)
MCHC RBC AUTO-ENTMCNC: 33.2 G/DL (ref 31.5–35.7)
MCV RBC AUTO: 90 FL (ref 79–97)
MONOCYTES # BLD AUTO: 0.6 X10E3/UL (ref 0.1–0.9)
MONOCYTES NFR BLD AUTO: 8 %
NEUTROPHILS # BLD AUTO: 3.5 X10E3/UL (ref 1.4–7)
NEUTROPHILS NFR BLD AUTO: 52 %
PLATELET # BLD AUTO: 309 X10E3/UL (ref 150–450)
POTASSIUM SERPL-SCNC: 4.4 MMOL/L (ref 3.5–5.2)
PROT SERPL-MCNC: 6.8 G/DL (ref 6–8.5)
RBC # BLD AUTO: 4.64 X10E6/UL (ref 3.77–5.28)
SODIUM SERPL-SCNC: 144 MMOL/L (ref 134–144)
TRIGL SERPL-MCNC: 165 MG/DL (ref 0–149)
TSH SERPL DL<=0.005 MIU/L-ACNC: 0.91 UIU/ML (ref 0.45–4.5)
VLDLC SERPL CALC-MCNC: 29 MG/DL (ref 5–40)
WBC # BLD AUTO: 6.8 X10E3/UL (ref 3.4–10.8)

## 2022-04-05 NOTE — PROGRESS NOTES
Please let patient know-- all labs are normal besides elevated LDL/triglycerides-- would recommend low saturated fat diet and increased exercise. Would recommend f/u with Dr. De eJsus as scheduled.

## 2022-05-03 ENCOUNTER — PREP FOR SURGERY (OUTPATIENT)
Dept: OTHER | Facility: HOSPITAL | Age: 62
End: 2022-05-03

## 2022-05-03 DIAGNOSIS — Z12.11 ENCOUNTER FOR SCREENING FOR MALIGNANT NEOPLASM OF COLON: Primary | ICD-10-CM

## 2022-06-15 ENCOUNTER — APPOINTMENT (OUTPATIENT)
Dept: CARDIOLOGY | Facility: HOSPITAL | Age: 62
End: 2022-06-15

## 2022-06-15 ENCOUNTER — HOSPITAL ENCOUNTER (EMERGENCY)
Facility: HOSPITAL | Age: 62
Discharge: HOME | End: 2022-06-15
Attending: EMERGENCY MEDICINE | Admitting: EMERGENCY MEDICINE

## 2022-06-15 VITALS
RESPIRATION RATE: 15 BRPM | OXYGEN SATURATION: 96 % | SYSTOLIC BLOOD PRESSURE: 145 MMHG | TEMPERATURE: 98.7 F | HEART RATE: 88 BPM | DIASTOLIC BLOOD PRESSURE: 97 MMHG

## 2022-06-15 DIAGNOSIS — I82.462 ACUTE DEEP VEIN THROMBOSIS (DVT) OF CALF MUSCLE VEIN OF LEFT LOWER EXTREMITY: Primary | ICD-10-CM

## 2022-06-15 LAB
ANION GAP SERPL CALCULATED.3IONS-SCNC: 12 MMOL/L (ref 5–15)
BH CV LOW VAS LEFT POSTERIOR TIBIAL VESSEL: 1
BH CV LOWER VASCULAR LEFT COMMON FEMORAL AUGMENT: NORMAL
BH CV LOWER VASCULAR LEFT COMMON FEMORAL COMPETENT: NORMAL
BH CV LOWER VASCULAR LEFT COMMON FEMORAL COMPRESS: NORMAL
BH CV LOWER VASCULAR LEFT COMMON FEMORAL PHASIC: NORMAL
BH CV LOWER VASCULAR LEFT COMMON FEMORAL SPONT: NORMAL
BH CV LOWER VASCULAR LEFT DISTAL FEMORAL COMPRESS: NORMAL
BH CV LOWER VASCULAR LEFT GASTRONEMIUS COMPRESS: NORMAL
BH CV LOWER VASCULAR LEFT GREATER SAPH AK COMPRESS: NORMAL
BH CV LOWER VASCULAR LEFT GREATER SAPH BK COMPRESS: NORMAL
BH CV LOWER VASCULAR LEFT LESSER SAPH COMPRESS: NORMAL
BH CV LOWER VASCULAR LEFT MID FEMORAL AUGMENT: NORMAL
BH CV LOWER VASCULAR LEFT MID FEMORAL COMPETENT: NORMAL
BH CV LOWER VASCULAR LEFT MID FEMORAL COMPRESS: NORMAL
BH CV LOWER VASCULAR LEFT MID FEMORAL PHASIC: NORMAL
BH CV LOWER VASCULAR LEFT MID FEMORAL SPONT: NORMAL
BH CV LOWER VASCULAR LEFT PERONEAL COMPRESS: NORMAL
BH CV LOWER VASCULAR LEFT POPLITEAL AUGMENT: NORMAL
BH CV LOWER VASCULAR LEFT POPLITEAL COMPETENT: NORMAL
BH CV LOWER VASCULAR LEFT POPLITEAL COMPRESS: NORMAL
BH CV LOWER VASCULAR LEFT POPLITEAL PHASIC: NORMAL
BH CV LOWER VASCULAR LEFT POPLITEAL SPONT: NORMAL
BH CV LOWER VASCULAR LEFT POSTERIOR TIBIAL COMPRESS: NORMAL
BH CV LOWER VASCULAR LEFT POSTERIOR TIBIAL THROMBUS: NORMAL
BH CV LOWER VASCULAR LEFT PROFUNDA FEMORAL COMPRESS: NORMAL
BH CV LOWER VASCULAR LEFT PROXIMAL FEMORAL COMPRESS: NORMAL
BH CV LOWER VASCULAR LEFT SAPHENOFEMORAL JUNCTION COMPRESS: NORMAL
BH CV LOWER VASCULAR LEFT SOLEAL COMPRESS: NORMAL
BH CV LOWER VASCULAR RIGHT COMMON FEMORAL AUGMENT: NORMAL
BH CV LOWER VASCULAR RIGHT COMMON FEMORAL COMPETENT: NORMAL
BH CV LOWER VASCULAR RIGHT COMMON FEMORAL COMPRESS: NORMAL
BH CV LOWER VASCULAR RIGHT COMMON FEMORAL PHASIC: NORMAL
BH CV LOWER VASCULAR RIGHT COMMON FEMORAL SPONT: NORMAL
BUN SERPL-MCNC: 12 MG/DL (ref 8–23)
BUN/CREAT SERPL: 14.1 (ref 7–25)
CALCIUM SPEC-SCNC: 9.7 MG/DL (ref 8.6–10.5)
CHLORIDE SERPL-SCNC: 104 MMOL/L (ref 98–107)
CO2 SERPL-SCNC: 25 MMOL/L (ref 22–29)
CREAT SERPL-MCNC: 0.85 MG/DL (ref 0.57–1)
EGFRCR SERPLBLD CKD-EPI 2021: 78.1 ML/MIN/1.73
GLUCOSE SERPL-MCNC: 105 MG/DL (ref 65–99)
INR PPP: 0.99 (ref 0.9–1.1)
MAXIMAL PREDICTED HEART RATE: 159 BPM
POTASSIUM SERPL-SCNC: 3.9 MMOL/L (ref 3.5–5.2)
PROTHROMBIN TIME: 13 SECONDS (ref 11.7–14.2)
SODIUM SERPL-SCNC: 141 MMOL/L (ref 136–145)
STRESS TARGET HR: 135 BPM

## 2022-06-15 PROCEDURE — 36415 COLL VENOUS BLD VENIPUNCTURE: CPT

## 2022-06-15 PROCEDURE — 80048 BASIC METABOLIC PNL TOTAL CA: CPT | Performed by: NURSE PRACTITIONER

## 2022-06-15 PROCEDURE — 93971 EXTREMITY STUDY: CPT

## 2022-06-15 PROCEDURE — 85610 PROTHROMBIN TIME: CPT | Performed by: NURSE PRACTITIONER

## 2022-06-15 PROCEDURE — 99282 EMERGENCY DEPT VISIT SF MDM: CPT

## 2022-06-15 NOTE — PROGRESS NOTES
-- DO NOT REPLY / DO NOT REPLY ALL --  -- Message is from the [facility]--    Offered Waitlist if Available for the Visit Type? No    Caller is requesting an appointment - at a sooner time than what was available.      Caller wants sooner appointment - offered trusted partner & sister site            Patient is willing to be seen by: PCP only    Reason for Visit: ER post hospital follow up     Is the patient currently scheduled? No    Preferred time to be seen:4/25-4/29 anytime before 10am  //  Caller Information       Type Contact Phone    04/25/2022 08:49 AM CDT Phone (Incoming) Bhupinder (Emergency Contact) [phone]          Alternative phone number: none     Turnaround time given to caller:   \"This message will be sent to [state Provider's name]. The clinical team will fulfill your request as soon as they review your message.\"   Preliminary results of today's left lower extremity venous duplex are positive for acute left calf deep vein thrombosis. Results called to TRISTEN Jackson.

## 2022-06-15 NOTE — ED TRIAGE NOTES
Patient from home via EMS reporting a hard lump on her left calf that is surrounded by redness. Patient states it is only painful when touched, she first noticed area this morning.

## 2022-06-16 NOTE — DISCHARGE INSTRUCTIONS
Medicine as prescribed, follow up with PCP for recheck. Return to care if develop chest pain, shortness of breath, or with further concerns.

## 2022-06-16 NOTE — PROGRESS NOTES
[facility] Clinical Pharmacy Services: Pharmacy Education - Direct Oral Anticoagulant - Eliquis    Ivonne Camara has been ordered Eliquis starter pack (10 mg PO BID x 7 days followed by 5 mg PO BID thereafter) for treatment of acute left calf DVT.     Counseling points included the followin. Eliquis's indication, patient's need for the medication, and dosing/frequency of this medication.  2. Enforced the importance of taking their medication as instructed every day and the reason why the medication is dosed that way.  3. Explained possible side effects of anticoagulation therapy, including increased risk of bleeding, and s/sx of bleeding. Also talked about ways to control bleeding for minor cuts and scrapes.  4. Emphasized the importance of going to the emergency room if any of the following occur: Falling and hitting your head; noticing bright red blood in urine or dark/tarry stools; vomiting up blood or vomit has a coffee-ground like texture; coughing up blood.  5. Discussed the importance of informing any physician or dentist that they have been started on a DOAC, in case they need to be taken off for a procedure.  6. Discussed all important drug interactions, including over-the-counter medications and supplements.  7. Instructed the patient not to begin or discontinue any medications without informing his/her physician/pharmacist.     I also explained to the patient that this medication may have a high copay associated with it and to let her provider know if it is unaffordable. First fill was processed through a free-trial  coupon and was given to the patient at no cost. I also gave the patient a $10 co-pay card that she can use in addition to her commercial insurance for subsequent fills, patient is aware that she will need to activate this co-pay card online or via telephone.      Patient expressed understanding and had no further questions.      Rayray Tyler, PharmD  Clinical  Pharmacist, Emergency Medicine   Phone: [phone]

## 2022-06-16 NOTE — ED PROVIDER NOTES
EMERGENCY DEPARTMENT ENCOUNTER    Room Number:  A02/02  Date of encounter:  6/15/2022  PCP: Provider, No Known  Historian: Patient      HPI:  Chief Complaint: Left calf pain  A complete HPI/ROS/PMH/PSH/SH/FH are unobtainable due to: Nothing    Context: Ivonne Camara is a 62 y.o. female who presents to the ED c/o moderate severity left calf pain and erythema that she first noticed today when she went to prop her feet up and felt discomfort.  It is located right at the distal aspect of the calf muscle.  It does not radiate, there is mild swelling, it hurts to push on it and a little bit to walk.  No chest pain or shortness of breath no previous history of VTE.    Patient does have a family history of VTE, specifically her mother.  She is not a smoker, has not had any recent surgeries, but she has had a fair amount of recent travel including lengthy car rides and plane rides      PAST MEDICAL HISTORY  Active Ambulatory Problems     Diagnosis Date Noted   • Anxiety 03/28/2016   • Hypothyroidism, adult    • Hyperlipidemia    • Routine adult health maintenance 04/04/2022   • CKD (chronic kidney disease) stage 2, GFR 60-89 ml/min 04/04/2022     Resolved Ambulatory Problems     Diagnosis Date Noted   • Renal calculi      Past Medical History:   Diagnosis Date   • H/O mammogram    • Hypercholesterolemia    • Hypothyroidism          PAST SURGICAL HISTORY  Past Surgical History:   Procedure Laterality Date   • PAP SMEAR  2017         FAMILY HISTORY  Family History   Problem Relation Age of Onset   • Dementia Mother    • Stroke Mother    • Other Sister         substance abuse   • Heart disease Brother         age 62, aortic dissection         SOCIAL HISTORY  Social History     Socioeconomic History   • Marital status:    Tobacco Use   • Smoking status: Former Smoker   • Smokeless tobacco: Never Used   Vaping Use   • Vaping Use: Never used   Substance and Sexual Activity   • Alcohol use: Yes   • Drug use: Yes      Types: Other     Comment: Prescribed Alprazolam   • Sexual activity: Yes     Partners: Male     Birth control/protection: None         ALLERGIES  Patient has no known allergies.        REVIEW OF SYSTEMS  Review of Systems     All systems reviewed and negative except for those discussed in HPI.       PHYSICAL EXAM    I have reviewed the triage vital signs and nursing notes.    ED Triage Vitals   Temp Heart Rate Resp BP SpO2   06/15/22 1809 06/15/22 1809 06/15/22 1809 06/15/22 1812 06/15/22 1809   98.7 °F (37.1 °C) 88 15 145/97 96 %      Temp src Heart Rate Source Patient Position BP Location FiO2 (%)   -- -- -- -- --              Physical Exam  GENERAL: not distressed  HENT: nares patent  EYES: no scleral icterus  CV: regular rhythm, regular rate  RESPIRATORY: normal effort  ABDOMEN: soft  MUSCULOSKELETAL: no deformity.  There is a focal area of erythema and tenderness to palpation at the distal aspect of the gastrocs muscle group where it meets with the widest portion of the Achilles tendon.  It is not circumferential, there is no induration or fluctuance.  No proximal extension.  NEURO: alert, moves all extremities, follows commands  SKIN: warm, dry        LAB RESULTS  Recent Results (from the past 24 hour(s))   Duplex Venous Lower Extremity - Left    Collection Time: 06/15/22  7:29 PM   Result Value Ref Range    Target HR (85%) 135 bpm    Max. Pred. HR (100%) 159 bpm    Left Posterior Tibial Vessel 1     Right Common Femoral Spont Y     Right Common Femoral Phasic Y     Right Common Femoral Augment Y     Right Common Femoral Competent Y     Right Common Femoral Compress C     Left Common Femoral Spont Y     Left Common Femoral Phasic Y     Left Common Femoral Augment Y     Left Common Femoral Competent Y     Left Common Femoral Compress C     Left Saphenofemoral Junction Compress C     Left Profunda Femoral Compress C     Left Proximal Femoral Compress C     Left Mid Femoral Spont Y     Left Mid Femoral Phasic Y      Left Mid Femoral Augment Y     Left Mid Femoral Competent Y     Left Mid Femoral Compress C     Left Distal Femoral Compress C     Left Popliteal Spont Y     Left Popliteal Phasic Y     Left Popliteal Augment Y     Left Popliteal Competent Y     Left Popliteal Compress C     Left Posterior Tibial Compress N     Left Posterior Tibial Thrombus A     Left Peroneal Compress C     Left Gastronemius Compress C     Left Greater Saph AK Compress C     Left Greater Saph BK Compress C     Left Lesser Saph Compress C     Lt soleal compress C    Basic Metabolic Panel    Collection Time: 06/15/22  7:50 PM    Specimen: Blood   Result Value Ref Range    Glucose 105 (H) 65 - 99 mg/dL    BUN 12 8 - 23 mg/dL    Creatinine 0.85 0.57 - 1.00 mg/dL    Sodium 141 136 - 145 mmol/L    Potassium 3.9 3.5 - 5.2 mmol/L    Chloride 104 98 - 107 mmol/L    CO2 25.0 22.0 - 29.0 mmol/L    Calcium 9.7 8.6 - 10.5 mg/dL    BUN/Creatinine Ratio 14.1 7.0 - 25.0    Anion Gap 12.0 5.0 - 15.0 mmol/L    eGFR 78.1 >60.0 mL/min/1.73   Protime-INR    Collection Time: 06/15/22  7:50 PM    Specimen: Blood   Result Value Ref Range    Protime 13.0 11.7 - 14.2 Seconds    INR 0.99 0.90 - 1.10       Ordered the above labs and independently reviewed the results.        RADIOLOGY  Duplex Venous Lower Extremity - Left    Result Date: 6/15/2022  · Acute left lower extremity deep vein thrombosis noted in the posterial tibial. · All other left sided veins appeared normal.        I ordered the above noted radiological studies. Reviewed by me and discussed with radiologist.  See dictation for official radiology interpretation.      PROCEDURES    Procedures      MEDICATIONS GIVEN IN ER    Medications - No data to display      PROGRESS, DATA ANALYSIS, CONSULTS, AND MEDICAL DECISION MAKING    All labs have been independently reviewed by me.  All radiology studies have been reviewed by me and discussed with radiologist dictating the report.   EKG's independently viewed and  interpreted by me.  Discussion below represents my analysis of pertinent findings related to patient's condition, differential diagnosis, treatment plan and final disposition.        ED Course as of 06/15/22 2320   Wed Stefan 15, 2022   1933 Discussed with Jennifer vascular tech, patient is positive for acute DVT in the left calf vein. [MS]   1942 Reviewed pt's history and workup with Dr. Sumner.  After a bedside evaluation, he agrees with the plan of care.     [MS]   2319 Patient does have an acute calf vein thrombus.  Recommendation on that is 6 weeks of oral anticoagulants at a minimum and follow-up with hematology in the outpatient setting.  This likely was a provoked VTE due to the extensive travel but I encouraged her to follow-up with hematology for any hypercoagulable work-up that may be indicated. [DP]   2320 Certainly no clinical evidence for PE, and her renal function is normal.  We will syndrome with an Eliquis starter pack [DP]      ED Course User Index  [DP] Shahid Sumner MD  [MS] Shruti Arboleda, APRN           PPE: The patient wore a surgical mask throughout the entire patient encounter. I wore an N95.    AS OF 23:20 EDT VITALS:    BP - 145/97  HR - 88  TEMP - 98.7 °F (37.1 °C)  O2 SATS - 96%        DIAGNOSIS  Final diagnoses:   Acute deep vein thrombosis (DVT) of calf muscle vein of left lower extremity (HCC)         DISPOSITION  Discharge           Shahid Sumner MD  06/15/22 2320

## 2022-06-29 ENCOUNTER — OFFICE VISIT (OUTPATIENT)
Dept: INTERNAL MEDICINE | Facility: CLINIC | Age: 62
End: 2022-06-29

## 2022-06-29 ENCOUNTER — TELEPHONE (OUTPATIENT)
Dept: INTERNAL MEDICINE | Facility: CLINIC | Age: 62
End: 2022-06-29

## 2022-06-29 VITALS
WEIGHT: 178.6 LBS | HEIGHT: 68 IN | OXYGEN SATURATION: 97 % | BODY MASS INDEX: 27.07 KG/M2 | RESPIRATION RATE: 18 BRPM | SYSTOLIC BLOOD PRESSURE: 132 MMHG | HEART RATE: 64 BPM | TEMPERATURE: 97.7 F | DIASTOLIC BLOOD PRESSURE: 88 MMHG

## 2022-06-29 DIAGNOSIS — I82.462 ACUTE DEEP VEIN THROMBOSIS (DVT) OF CALF MUSCLE VEIN OF LEFT LOWER EXTREMITY: ICD-10-CM

## 2022-06-29 DIAGNOSIS — I82.462 ACUTE DEEP VEIN THROMBOSIS (DVT) OF CALF MUSCLE VEIN OF LEFT LOWER EXTREMITY: Primary | ICD-10-CM

## 2022-06-29 PROCEDURE — 99213 OFFICE O/P EST LOW 20 MIN: CPT

## 2022-06-29 NOTE — PATIENT INSTRUCTIONS
Continue apixaban as prescribed. Follow-up with Dr. De Jesus. Hematology appointment to be scheduled after visit with Dr. De Jesus (7/12/22).

## 2022-06-29 NOTE — PROGRESS NOTES
"Chief Complaint  Follow-up (Pt present to us today following up with a blood clot in her left leg was put on eliquis at ER when she went on the 15)    Subjective        Ivonne Camara presents to [facility]  62 y.o. female presenting after an ER visit on 6/15/22 for an acute DVT in the left calf. Started on apixaban starter pack.  Has completed 2 weeks of that and has 2 more weeks left and started back.  She has no side effects or reactions to medication and tolerating it well.  Left calf has improved with minimal heat and the firmness has decreased.  She feels as if it has normalized.  Patient has questions about what to do from here regarding medication and potential ultrasound to see if clot has resolved. Denies chest pain, difficulty breathing, swelling of hands or feet, constipation, dysuria and changes in vision or hearing.        Objective   Vital Signs:  /88 (BP Location: Right arm, Patient Position: Sitting, Cuff Size: Adult)   Pulse 64   Temp 97.7 °F (36.5 °C)   Resp 18   Ht 172.7 cm (68\")   Wt 81 kg (178 lb 9.6 oz)   SpO2 97%   BMI 27.16 kg/m²   Estimated body mass index is 27.16 kg/m² as calculated from the following:    Height as of this encounter: 172.7 cm (68\").    Weight as of this encounter: 81 kg (178 lb 9.6 oz).          Physical Exam  Vitals and nursing note reviewed.   Constitutional:       Appearance: Normal appearance.   HENT:      Head: Normocephalic.   Cardiovascular:      Rate and Rhythm: Normal rate.      Pulses: Normal pulses.   Pulmonary:      Effort: Pulmonary effort is normal.   Musculoskeletal:         General: No swelling or tenderness. Normal range of motion.      Right lower leg: No edema.      Left lower leg: No edema.        Legs:    Skin:     General: Skin is warm and dry.      Capillary Refill: Capillary refill takes less than 2 seconds.   Neurological:      General: No focal deficit present.      Mental Status: She is alert and " oriented to person, place, and time.   Psychiatric:         Mood and Affect: Mood normal.         Behavior: Behavior normal.         Thought Content: Thought content normal.         Judgment: Judgment normal.        Result Review :{Labs  Result Review  Imaging  Med Tab  Media  Procedures  :23}  The following data was reviewed by: TRISTEN Giron on 06/29/2022:  Common labs    Common Labsle 4/4/22 4/4/22 4/4/22 4/4/22 6/15/22    0857 0857 0857 0857    Glucose  99   105 (A)   BUN  14   12   Creatinine  0.90   0.85   Sodium  144   141   Potassium  4.4   3.9   Chloride  102   104   Calcium  9.9   9.7   Total Protein  6.8      Albumin  4.4      Total Bilirubin  0.7      Alkaline Phosphatase  72      AST (SGOT)  15      ALT (SGPT)  14      WBC 6.8       Hemoglobin 13.8       Hematocrit 41.6       Platelets 309       Total Cholesterol   186     Triglycerides   165 (A)     HDL Cholesterol   52     LDL Cholesterol    105 (A)     Hemoglobin A1C    5.6    (A) Abnormal value       Comments are available for some flowsheets but are not being displayed.           Data reviewed: Radiologic studies 6/15/22.          Assessment and Plan   Diagnoses and all orders for this visit:    1. Acute deep vein thrombosis (DVT) of calf muscle vein of left lower extremity (HCC) (Primary)  -     apixaban (ELIQUIS) 5 MG tablet tablet; Take 1 tablet by mouth 2 (Two) Times a Day.  Dispense: 60 tablet; Refill: 1  -     Ambulatory Referral to Hematology    DVT to left calf muscle improving. Continue apixaban as prescribed. Follow-up with Dr. De Jesus. Hematology appointment to be scheduled after visit with Dr. De Jesus (7/12/22).     Discussed with the patient and all questioned fully answered. She will call me if any problems arise.         Follow Up   Return for Next scheduled follow up.  Patient was given instructions and counseling regarding her condition or for health maintenance advice. Please see specific information pulled into the AVS if  appropriate.

## 2022-07-01 NOTE — TELEPHONE ENCOUNTER
FYI    This Pt is establishing with you this month  
PATIENT CALLING BACK IN REGARDS TO PAPERWORK FOR MEDICATION    apixaban (ELIQUIS) 5 MG tablet tablet    PHARMACY HAS SENT PAPERWORK TO BE FILLED OUT BY THE OFFICE.      SHE HAS TWO WEEKS OF MEDICATION LEFT FROM HOSPITAL. SHE HAS AN APPOINTMENT WITH DR. HOFFMAN ON 7/12/22     [facility]/pharmacy #[id number] - [place], [place] - [address] AT [facility] - [phone]  - [phone] FX  [phone]    SEE PREVIOUS NOTE    CALL BACK NUMBER [phone]  
RX sent , pharmacy will send paper work  
Rx Sent  
normal balance

## 2022-07-12 ENCOUNTER — OFFICE VISIT (OUTPATIENT)
Dept: INTERNAL MEDICINE | Facility: CLINIC | Age: 62
End: 2022-07-12

## 2022-07-12 VITALS
DIASTOLIC BLOOD PRESSURE: 80 MMHG | BODY MASS INDEX: 27.13 KG/M2 | WEIGHT: 179 LBS | HEART RATE: 67 BPM | SYSTOLIC BLOOD PRESSURE: 130 MMHG | OXYGEN SATURATION: 99 % | TEMPERATURE: 97.3 F | HEIGHT: 68 IN

## 2022-07-12 DIAGNOSIS — Z76.89 ENCOUNTER TO ESTABLISH CARE: Primary | ICD-10-CM

## 2022-07-12 DIAGNOSIS — F41.9 ANXIETY: ICD-10-CM

## 2022-07-12 DIAGNOSIS — I82.432 DEEP VEIN THROMBOSIS (DVT) OF POPLITEAL VEIN OF LEFT LOWER EXTREMITY, UNSPECIFIED CHRONICITY: ICD-10-CM

## 2022-07-12 PROBLEM — I82.409 DVT (DEEP VENOUS THROMBOSIS) (HCC): Status: ACTIVE | Noted: 2022-07-12

## 2022-07-12 PROCEDURE — 99214 OFFICE O/P EST MOD 30 MIN: CPT | Performed by: FAMILY MEDICINE

## 2022-07-12 RX ORDER — ALPRAZOLAM 0.5 MG/1
0.5 TABLET ORAL NIGHTLY PRN
Qty: 15 TABLET | Refills: 2 | Status: SHIPPED | OUTPATIENT
Start: 2022-07-12 | End: 2022-07-22 | Stop reason: SDUPTHER

## 2022-07-12 NOTE — PROGRESS NOTES
"    Chief Complaint  Establish Care (NEW PT ) and Hyperlipidemia    Subjective    History of Present Illness {CC  Problem List  Visit  Diagnosis   Encounters  Notes  Medications  Labs  Result Review Imaging  Media :23}     Ivonne Camara presents to [facility] for   History of Present Illness   61 yo female presen o establish care. She is new to me, previously seeing Dr. Latoya Roberts. She has a history of hypothyroidism, hyperlipidemia, CKD, and anxiety.  Recently diagnosed with DVT, now on eliquis.   Prior to DVT she had a couple of long trips.  She is scheduled for colonoscopy.  She states up to date with pap, normal.  No family history of blood disorders.    Covid last year which change taste which has caused about 30 pounds since then.    States she worries about things a lot, Sometime bother some much that cannot sleep. Taking cbc gummies and xanax if issues sleeping. Anxious, feelings of dread. Using xanax about 2-3 times a week. Sometimes cut in half.  She has never taken long acting anxiety medication.     Social History     Socioeconomic History   • Marital status:    Tobacco Use   • Smoking status: Former Smoker     Packs/day: 0.50     Years: 10.00     Pack years: 5.00     Types: Cigarettes     Start date: 2008     Quit date: 2012     Years since quitting: 10.5   • Smokeless tobacco: Never Used   Vaping Use   • Vaping Use: Never used   Substance and Sexual Activity   • Alcohol use: Yes   • Drug use: Yes     Types: Other     Comment: Prescribed Alprazolam   • Sexual activity: Yes     Partners: Male     Birth control/protection: None      Objective     Vital Signs:   /80   Pulse 67   Temp 97.3 °F (36.3 °C)   Ht 172.7 cm (67.99\")   Wt 81.2 kg (179 lb)   SpO2 99%   BMI 27.22 kg/m²   Physical Exam  Constitutional:       Appearance: She is not ill-appearing.   HENT:      Head: Normocephalic.      Right Ear: Tympanic membrane normal.      Left Ear: " Tympanic membrane normal.   Cardiovascular:      Rate and Rhythm: Regular rhythm.      Heart sounds: Normal heart sounds.   Pulmonary:      Effort: No respiratory distress.      Breath sounds: Normal breath sounds. No wheezing.   Musculoskeletal:      Right lower leg: No edema.      Left lower leg: No edema.   Neurological:      Mental Status: She is alert.   Psychiatric:         Mood and Affect: Mood normal.        Result Review  Data Reviewed:{ Labs  Result Review  Imaging  Med Tab  Media :23}   The following data was reviewed by: Debbie De Jesus MD on 07/12/2022  Lab Results - Last 18 Months   Lab Units 06/15/22  1950 04/04/22  0857 03/22/21  0844   BUN mg/dL 12 14 12   CREATININE mg/dL 0.85 0.90 0.95   EGFR IF NONAFRICN AM mL/min/1.73  --   --  60*   EGFR IF AFRICN AM mL/min/1.73  --   --  73   SODIUM mmol/L 141 144 142   POTASSIUM mmol/L 3.9 4.4 4.5   CHLORIDE mmol/L 104 102 103   CALCIUM mg/dL 9.7 9.9 9.9   ALBUMIN g/dL  --  4.4 4.10   BILIRUBIN mg/dL  --  0.7 0.5   ALK PHOS IU/L  --  72 65   AST (SGOT) IU/L  --  15 16   ALT (SGPT) IU/L  --  14 21   CHOLESTEROL mg/dL  --  186 184   TRIGLYCERIDES mg/dL  --  165* 173*   HDL CHOL mg/dL  --  52 51   VLDL CHOLESTEROL JAYDEN mg/dL  --  29 30   LDL CHOL mg/dL  --  105* 103*   HEMOGLOBIN A1C %  --  5.6  --    WBC x10E3/uL  --  6.8 6.05   RBC x10E6/uL  --  4.64 4.67   HEMATOCRIT %  --  41.6 41.1   MCV fL  --  90 88.0   MCH pg  --  29.7 29.1   TSH uIU/mL  --  0.911 1.790   INR  0.99  --   --               Current Outpatient Medications:   •  ALPRAZolam (XANAX) 0.5 MG tablet, Take 1 tablet by mouth At Night As Needed for Anxiety., Disp: 15 tablet, Rfl: 2  •  apixaban (ELIQUIS) 5 MG tablet tablet, Take 1 tablet by mouth 2 (Two) Times a Day., Disp: 60 tablet, Rfl: 1  •  Calcium Carb-Cholecalciferol 1000-800 MG-UNIT tablet, Take  by mouth., Disp: , Rfl:   •  magnesium gluconate (MAGONATE) 500 MG tablet, Take 27 mg by mouth 2 (Two) Times a Day., Disp: , Rfl:   •  Omega-3  Fatty Acids (fish oil) 1000 MG capsule capsule, Take  by mouth Daily With Breakfast., Disp: , Rfl:   •  Synthroid 100 MCG tablet, TAKE 1 TABLET DAILY, Disp: 90 tablet, Rfl: 3  •  Unable to find, 1 each 1 (One) Time. Med Name: Delta 8 CBD gummy, Disp: , Rfl:   •  VITAMIN D PO, Take 1,000 mg by mouth Daily., Disp: , Rfl:   •  vitamin E 1000 UNIT capsule, Take 1,000 Units by mouth Daily., Disp: , Rfl:   •  Zinc 100 MG tablet, Take  by mouth., Disp: , Rfl:       Assessment and Plan {CC Problem List  Visit Diagnosis  ROS  Review (Popup)  Health Maintenance  Quality  BestPractice  Medications  SmartSets  SnapShot Encounters  Media :23}   Problem List Items Addressed This Visit        Coag and Thromboembolic    DVT (deep venous thrombosis) (HCC)    Current Assessment & Plan     Continue eliquis  Scheduled for colonosocopy  States normal pap  Discussion today about possible cause of dvt, she has no family history of blood disorders, she has scheduled screening test   She would like to hold at this time with hematology           Relevant Orders    CBC & Differential (Completed)       Mental Health    Anxiety    Current Assessment & Plan     Taking alprazolam as needed  Has never taken any other medication   Discussed long acting medication option, does not want at this time.   Will follow up in a few months. Goal to keep from having up and down with her anxiety.             Relevant Medications    ALPRAZolam (XANAX) 0.5 MG tablet      Other Visit Diagnoses     Encounter to establish care    -  Primary          Follow Up   Return in about 3 months (around 10/12/2022) for Recheck check up dvt.  Patient was given instructions and counseling regarding her condition or for health maintenance advice. Please see specific information pulled into the AVS if appropriate.    EpicAct:MR_WS_AMB_ORDERS,RunParams:STARTUPTYPE=FOLLOW    MR_WS_AMB_DISCHARGE

## 2022-07-13 LAB
BASOPHILS # BLD AUTO: 0 X10E3/UL (ref 0–0.2)
BASOPHILS NFR BLD AUTO: 1 %
EOSINOPHIL # BLD AUTO: 0.1 X10E3/UL (ref 0–0.4)
EOSINOPHIL NFR BLD AUTO: 1 %
ERYTHROCYTE [DISTWIDTH] IN BLOOD BY AUTOMATED COUNT: 12.7 % (ref 11.7–15.4)
HCT VFR BLD AUTO: 41.6 % (ref 34–46.6)
HGB BLD-MCNC: 13.9 G/DL (ref 11.1–15.9)
IMM GRANULOCYTES # BLD AUTO: 0 X10E3/UL (ref 0–0.1)
IMM GRANULOCYTES NFR BLD AUTO: 0 %
LYMPHOCYTES # BLD AUTO: 2.4 X10E3/UL (ref 0.7–3.1)
LYMPHOCYTES NFR BLD AUTO: 37 %
MCH RBC QN AUTO: 30.2 PG (ref 26.6–33)
MCHC RBC AUTO-ENTMCNC: 33.4 G/DL (ref 31.5–35.7)
MCV RBC AUTO: 90 FL (ref 79–97)
MONOCYTES # BLD AUTO: 0.5 X10E3/UL (ref 0.1–0.9)
MONOCYTES NFR BLD AUTO: 7 %
NEUTROPHILS # BLD AUTO: 3.7 X10E3/UL (ref 1.4–7)
NEUTROPHILS NFR BLD AUTO: 54 %
PLATELET # BLD AUTO: 300 X10E3/UL (ref 150–450)
RBC # BLD AUTO: 4.6 X10E6/UL (ref 3.77–5.28)
WBC # BLD AUTO: 6.7 X10E3/UL (ref 3.4–10.8)

## 2022-07-13 NOTE — ASSESSMENT & PLAN NOTE
Continue eliquis  Scheduled for colonosocopy  States normal pap  Discussion today about possible cause of dvt, she has no family history of blood disorders, she has scheduled screening test   She would like to hold at this time with hematology  Monitor for acute blood loss

## 2022-07-13 NOTE — ASSESSMENT & PLAN NOTE
Taking alprazolam as needed  Has never taken any other medication   Discussed long acting medication option, does not want at this time.   Will follow up in a few months. Goal to keep from having up and down with her anxiety.

## 2022-07-22 DIAGNOSIS — F41.9 ANXIETY: ICD-10-CM

## 2022-07-22 NOTE — TELEPHONE ENCOUNTER
Caller: Ivonne Camara    Relationship: Self    Best call back number: [phone]    Requested Prescriptions:   Requested Prescriptions     Pending Prescriptions Disp Refills   • ALPRAZolam (XANAX) 0.5 MG tablet 15 tablet 2     Sig: Take 1 tablet by mouth At Night As Needed for Anxiety.        Pharmacy where request should be sent: [facility]/PHARMACY #[id number] [place], [place] - [address] [address] AT [facility] [phone] [facility] [phone]      Additional details provided by patient: N/A    Does the patient have less than a 3 day supply:  [] Yes  [x] No    Finesse Hinojosa Rep   07/22/22 08:37 EDT

## 2022-07-26 DIAGNOSIS — F41.9 ANXIETY: ICD-10-CM

## 2022-07-26 RX ORDER — ALPRAZOLAM 0.5 MG/1
0.5 TABLET ORAL NIGHTLY PRN
Qty: 15 TABLET | Refills: 0 | Status: SHIPPED | OUTPATIENT
Start: 2022-07-26 | End: 2022-11-28 | Stop reason: SDUPTHER

## 2022-07-26 RX ORDER — ALPRAZOLAM 0.5 MG/1
0.5 TABLET ORAL NIGHTLY PRN
Qty: 15 TABLET | Refills: 0 | Status: SHIPPED | OUTPATIENT
Start: 2022-07-26 | End: 2022-07-26 | Stop reason: SDUPTHER

## 2022-08-05 RX ORDER — LEVOTHYROXINE SODIUM 0.1 MG/1
100 TABLET ORAL DAILY
Qty: 90 TABLET | Refills: 3 | Status: SHIPPED | OUTPATIENT
Start: 2022-08-05

## 2022-08-05 NOTE — TELEPHONE ENCOUNTER
Caller: [facility] - [place], [place] - [address] AT [address] TO [facility] [phone]  - [phone] FX    Relationship: Pharmacy    Best call back number: [phone] OPTION 2    Requested Prescriptions:   Requested Prescriptions     Pending Prescriptions Disp Refills   • levothyroxine (Synthroid) 100 MCG tablet 90 tablet 3     Sig: Take 1 tablet by mouth Daily.        Pharmacy where request should be sent: [facility] - [place], [place] - [address] AT [address] TO [facility] - [phone]  - [phone] FX     Does the patient have less than a 3 day supply:  [] Yes  [] No  UNKNOWN    REFERENCE # [id number]    Finesse Robbins Rep   08/05/22 12:32 EDT

## 2022-08-24 DIAGNOSIS — I82.462 ACUTE DEEP VEIN THROMBOSIS (DVT) OF CALF MUSCLE VEIN OF LEFT LOWER EXTREMITY: ICD-10-CM

## 2022-08-24 RX ORDER — APIXABAN 5 MG/1
TABLET, FILM COATED ORAL
Qty: 60 TABLET | Refills: 1 | Status: SHIPPED | OUTPATIENT
Start: 2022-08-24 | End: 2022-10-17

## 2022-09-26 RX ORDER — AMOXICILLIN 500 MG
2400 CAPSULE ORAL DAILY
COMMUNITY

## 2022-09-27 ENCOUNTER — HOSPITAL ENCOUNTER (OUTPATIENT)
Facility: HOSPITAL | Age: 62
Setting detail: HOSPITAL OUTPATIENT SURGERY
Discharge: HOME | End: 2022-09-27
Attending: INTERNAL MEDICINE | Admitting: INTERNAL MEDICINE

## 2022-09-27 ENCOUNTER — ANESTHESIA (OUTPATIENT)
Dept: GASTROENTEROLOGY | Facility: HOSPITAL | Age: 62
End: 2022-09-27

## 2022-09-27 ENCOUNTER — ANESTHESIA EVENT (OUTPATIENT)
Dept: GASTROENTEROLOGY | Facility: HOSPITAL | Age: 62
End: 2022-09-27

## 2022-09-27 VITALS
BODY MASS INDEX: 26.07 KG/M2 | OXYGEN SATURATION: 96 % | HEART RATE: 72 BPM | RESPIRATION RATE: 16 BRPM | DIASTOLIC BLOOD PRESSURE: 65 MMHG | HEIGHT: 68 IN | SYSTOLIC BLOOD PRESSURE: 130 MMHG | WEIGHT: 172 LBS

## 2022-09-27 DIAGNOSIS — Z12.11 ENCOUNTER FOR SCREENING FOR MALIGNANT NEOPLASM OF COLON: ICD-10-CM

## 2022-09-27 PROCEDURE — 45380 COLONOSCOPY AND BIOPSY: CPT | Performed by: INTERNAL MEDICINE

## 2022-09-27 PROCEDURE — S0260 H&P FOR SURGERY: HCPCS | Performed by: INTERNAL MEDICINE

## 2022-09-27 PROCEDURE — 88305 TISSUE EXAM BY PATHOLOGIST: CPT | Performed by: INTERNAL MEDICINE

## 2022-09-27 PROCEDURE — 25010000002 PROPOFOL 10 MG/ML EMULSION: Performed by: ANESTHESIOLOGY

## 2022-09-27 RX ORDER — PROPOFOL 10 MG/ML
VIAL (ML) INTRAVENOUS AS NEEDED
Status: DISCONTINUED | OUTPATIENT
Start: 2022-09-27 | End: 2022-09-27 | Stop reason: SURG

## 2022-09-27 RX ORDER — LIDOCAINE HYDROCHLORIDE 20 MG/ML
INJECTION, SOLUTION INFILTRATION; PERINEURAL AS NEEDED
Status: DISCONTINUED | OUTPATIENT
Start: 2022-09-27 | End: 2022-09-27 | Stop reason: SURG

## 2022-09-27 RX ORDER — SODIUM CHLORIDE, SODIUM LACTATE, POTASSIUM CHLORIDE, CALCIUM CHLORIDE 600; 310; 30; 20 MG/100ML; MG/100ML; MG/100ML; MG/100ML
30 INJECTION, SOLUTION INTRAVENOUS CONTINUOUS PRN
Status: DISCONTINUED | OUTPATIENT
Start: 2022-09-27 | End: 2022-09-27 | Stop reason: HOSPADM

## 2022-09-27 RX ADMIN — PROPOFOL 150 MCG/KG/MIN: 10 INJECTION, EMULSION INTRAVENOUS at 09:15

## 2022-09-27 RX ADMIN — PROPOFOL 70 MG: 10 INJECTION, EMULSION INTRAVENOUS at 09:14

## 2022-09-27 RX ADMIN — LIDOCAINE HYDROCHLORIDE 50 MG: 20 INJECTION, SOLUTION INFILTRATION; PERINEURAL at 09:14

## 2022-09-27 RX ADMIN — SODIUM CHLORIDE, POTASSIUM CHLORIDE, SODIUM LACTATE AND CALCIUM CHLORIDE 30 ML/HR: 600; 310; 30; 20 INJECTION, SOLUTION INTRAVENOUS at 08:27

## 2022-09-27 NOTE — H&P
[facility]  Pre Procedure History & Physical    Chief Complaint:   Colon cancer screening    Subjective     HPI:   63yo here today for initial screening colonoscopy.  Pt reports no FH CRC/polyps.  Patient denies GI symptoms currrently.    She has a history of DVT and is anticoagulated with Eliquis- held since 9/25.    Past Medical History:   Past Medical History:   Diagnosis Date   • Anxiety    • Blood clot in vein 06/2022    LEFT LOWER CALF: CURRENTLY ON ELIQUIS   • Hypercholesterolemia    • Hypothyroidism    • Insomnia    • Stress incontinence        Past Surgical History:  Past Surgical History:   Procedure Laterality Date   • BREAST EXCISIONAL BIOPSY Left    • KIDNEY STONE SURGERY      RUPTURED URETER AND STENT PLACEMENT   • TONSILLECTOMY     • WISDOM TOOTH EXTRACTION         Family History:  Family History   Problem Relation Age of Onset   • Dementia Mother    • Stroke Mother    • Other Sister         substance abuse   • Heart disease Brother         age 62, aortic dissection   • Malig Hyperthermia Neg Hx        Social History:   reports that she quit smoking about 10 years ago. Her smoking use included cigarettes. She started smoking about 14 years ago. She has a 5.00 pack-year smoking history. She has never used smokeless tobacco. She reports current alcohol use. She reports previous drug use.    Medications:   Medications Prior to Admission   Medication Sig Dispense Refill Last Dose   • ALPRAZolam (XANAX) 0.5 MG tablet Take 1 tablet by mouth At Night As Needed for Anxiety. 15 tablet 0 Past Month at Unknown time   • CALCIUM-MAGNESIUM-ZINC PO Take 3 tablets by mouth Daily. 1000MG/400MG/25MG   9/26/2022 at Unknown time   • Cholecalciferol (Vitamin D3) 25 MCG (1000 UT) capsule Take 1,000 Units by mouth Daily.   9/26/2022 at Unknown time   • Eliquis 5 MG tablet tablet TAKE 1 TABLET BY MOUTH TWICE A DAY (Patient taking differently: Take 5 mg by mouth Every 12 (Twelve) Hours. HOLDING FOR  "SURGERY  Cinthya DIAGNOSED WITH BLOOD CLOT IN LEFT LOWER CALF AREA) 60 tablet 1 9/25/2022   • levothyroxine (Synthroid) 100 MCG tablet Take 1 tablet by mouth Daily. 90 tablet 3 9/27/2022 at Unknown time   • Omega-3 Fatty Acids (fish oil) 1000 MG capsule capsule Take  by mouth Daily With Breakfast.   9/26/2022 at Unknown time   • Omega-3 Fatty Acids (fish oil) 1200 MG capsule capsule Take 2,400 mg by mouth Daily.   9/26/2022 at Unknown time   • Probiotic Product (PROBIOTIC PO) Take 1 capsule by mouth Daily.   9/26/2022 at Unknown time   • Unable to find Take 0.5 each by mouth Every Night. Med Name: Delta 8 CBD gummy   9/26/2022 at Unknown time       Allergies:  Patient has no known allergies.    ROS:    Pertinent items are noted in HPI, all other systems reviewed and negative     Objective     Blood pressure 132/48, pulse 67, resp. rate 16, height 172.7 cm (68\"), weight 78 kg (172 lb), SpO2 98 %, not currently breastfeeding.    Physical Exam   Constitutional: Pt is oriented to person, place, and time and well-developed, well-nourished, and in no distress.   Mouth/Throat: Oropharynx is clear and moist.   Neck: Normal range of motion.   Cardiovascular: Normal rate, regular rhythm    Pulmonary/Chest: Effort normal    Abdominal: Soft. Nontender  Skin: Skin is warm and dry.   Psychiatric: Mood, memory, affect and judgment normal.     Assessment & Plan     Diagnosis:  Screening for colon cancer    Anticipated Surgical Procedure:  colonoscopy    The risks, benefits, and alternatives of this procedure have been discussed with the patient or the responsible party- the patient understands and agrees to proceed.                                                              "

## 2022-09-27 NOTE — ANESTHESIA PREPROCEDURE EVALUATION
Anesthesia Evaluation                  Airway   Mallampati: II  TM distance: >3 FB  Neck ROM: full  No difficulty expected  Dental - normal exam     Pulmonary - normal exam   Cardiovascular - normal exam    (+) DVT, hyperlipidemia,       Neuro/Psych  (+) psychiatric history Anxiety,    GI/Hepatic/Renal/Endo    (+)   renal disease, thyroid problem     Musculoskeletal     Abdominal    Substance History      OB/GYN          Other                        Anesthesia Plan    ASA 3     MAC     intravenous induction     Anesthetic plan, risks, benefits, and alternatives have been provided, discussed and informed consent has been obtained with: patient.        CODE STATUS:

## 2022-09-27 NOTE — ANESTHESIA POSTPROCEDURE EVALUATION
Patient: Ivonne Camara    Procedure Summary     Date: 09/27/22 Room / Location:  [facility] /  [facility]    Anesthesia Start: 0914 Anesthesia Stop: 0936    Procedure: COLONOSCOPY to cecum/TI with cold biopsies (N/A ) Diagnosis:       Encounter for screening for malignant neoplasm of colon      (Encounter for screening for malignant neoplasm of colon [Z12.11])    Surgeons: Claudia Crook MD Provider: Abiola Mendes MD    Anesthesia Type: MAC ASA Status: 3          Anesthesia Type: MAC    Vitals  Vitals Value Taken Time   /69 09/27/22 0932   Temp     Pulse 69 09/27/22 0932   Resp 16 09/27/22 0932   SpO2 95 % 09/27/22 0932           Post Anesthesia Care and Evaluation    Patient location during evaluation: bedside  Patient participation: complete - patient participated  Level of consciousness: awake  Pain management: adequate    Airway patency: patent  Anesthetic complications: No anesthetic complications    Cardiovascular status: acceptable  Respiratory status: acceptable  Hydration status: acceptable

## 2022-09-28 LAB
LAB AP CASE REPORT: NORMAL
LAB AP DIAGNOSIS COMMENT: NORMAL
PATH REPORT.FINAL DX SPEC: NORMAL
PATH REPORT.GROSS SPEC: NORMAL

## 2022-09-29 ENCOUNTER — TELEPHONE (OUTPATIENT)
Dept: GASTROENTEROLOGY | Facility: CLINIC | Age: 62
End: 2022-09-29

## 2022-09-29 NOTE — TELEPHONE ENCOUNTER
----- Message from Claudia Crook MD sent at 9/29/2022  8:14 AM EDT -----  Small bowel and colon biopsy showed nonspecific inflammation.  This can be seen in the setting of NSAID use-I do not see any listed on her medication list but please recommend that she avoid over-the-counter NSAIDs as these could cause this type of inflammation.  In the absence of symptoms such as diarrhea or bloody stool, would not pursue any treatment at this time.  Should symptoms develop, please contact me.  Otherwise recommend repeat screening colonoscopy in 10 years

## 2022-09-29 NOTE — PROGRESS NOTES
Small bowel and colon biopsy showed nonspecific inflammation.  This can be seen in the setting of NSAID use-I do not see any listed on her medication list but please recommend that she avoid over-the-counter NSAIDs as these could cause this type of inflammation.  In the absence of symptoms such as diarrhea or bloody stool, would not pursue any treatment at this time.  Should symptoms develop, please contact me.  Otherwise recommend repeat screening colonoscopy in 10 years

## 2022-09-29 NOTE — TELEPHONE ENCOUNTER
Provider: MAURICIO  Caller: PHUC MARCUS  Relationship to Patient: SELF  Phone Number:[phone]    Reason for Call: PT STATED RECEIVED CALL ABOUT RESULTS OF NON SPECIFIC INFLAMMATION. THE QUESTION PRESENTED WAS IF PT TAKES ANY OVER THE COUNTER MEDICATION SUCH AS TYLENOL. PT REMEMBERED SHE EATS A LOT OF ALMONDS AND PISTACHIO. TAKES DAILY  CALCIUM, MAGNESIUM, ZINC AND FISH OIL; WAS TAKING VITAMIN E PRIOR BUT HAS STOPPED TAKING THIS. PT WANTS TO KNOW IF MAYBE THESE ITEMS WOULD CAUSE THE INFLAMMATION. PLEASE CALL AND ADVISE

## 2022-09-29 NOTE — TELEPHONE ENCOUNTER
"Call to pt.  Advise per DR Crook note.  Verb understanding.     Denies diarrhea or blood stool at this time - will call back as needed.     C/s for 9/27/32 placed in recall (unable to edit HM as has been \"discontinued).    "

## 2022-09-30 NOTE — TELEPHONE ENCOUNTER
None of these agents would cause inflammation - ok to take tylenol - would avoid nsaids and if symptoms develoop let me know but she may not have symptoms

## 2022-09-30 NOTE — TELEPHONE ENCOUNTER
Hub staff attempted to follow warm transfer process and was unsuccessful     Caller: Ivonne Camara    Relationship to patient: Self    Best call back number:[phone]    Patient is needing: PT SAID SORRY SHE MISSED SHARON CALL PLEASE TRY HER NUMBER AGAIN. THANKS

## 2022-10-13 ENCOUNTER — OFFICE VISIT (OUTPATIENT)
Dept: INTERNAL MEDICINE | Facility: CLINIC | Age: 62
End: 2022-10-13

## 2022-10-13 VITALS
HEIGHT: 68 IN | HEART RATE: 66 BPM | SYSTOLIC BLOOD PRESSURE: 126 MMHG | TEMPERATURE: 98.2 F | BODY MASS INDEX: 26.98 KG/M2 | DIASTOLIC BLOOD PRESSURE: 70 MMHG | WEIGHT: 178 LBS | OXYGEN SATURATION: 97 %

## 2022-10-13 DIAGNOSIS — Z23 NEED FOR INFLUENZA VACCINATION: Primary | ICD-10-CM

## 2022-10-13 DIAGNOSIS — E78.5 HYPERLIPIDEMIA, UNSPECIFIED HYPERLIPIDEMIA TYPE: ICD-10-CM

## 2022-10-13 DIAGNOSIS — I82.432 DEEP VEIN THROMBOSIS (DVT) OF POPLITEAL VEIN OF LEFT LOWER EXTREMITY, UNSPECIFIED CHRONICITY: ICD-10-CM

## 2022-10-13 DIAGNOSIS — F19.20: ICD-10-CM

## 2022-10-13 PROCEDURE — 90471 IMMUNIZATION ADMIN: CPT | Performed by: FAMILY MEDICINE

## 2022-10-13 PROCEDURE — 99214 OFFICE O/P EST MOD 30 MIN: CPT | Performed by: FAMILY MEDICINE

## 2022-10-13 PROCEDURE — 90686 IIV4 VACC NO PRSV 0.5 ML IM: CPT | Performed by: FAMILY MEDICINE

## 2022-10-13 NOTE — PROGRESS NOTES
"    Chief Complaint  Hyperlipidemia, Hypothyroidism, and Anxiety    Subjective    History of Present Illness {CC  Problem List  Visit  Diagnosis   Encounters  Notes  Medications  Labs  Result Review Imaging  Media :23}     Ivonne Camara presents to [facility] for     History of Present Illness   61 yo female present for follow up visit . She has had colonoscopy a few weeks ago..  Stable       States she is not taking alprazolam very often, using cbd gummies now instead.       Social History     Socioeconomic History   • Marital status:    Tobacco Use   • Smoking status: Former     Packs/day: 0.50     Years: 10.00     Pack years: 5.00     Types: Cigarettes     Start date: 2008     Quit date: 2012     Years since quitting: 10.8   • Smokeless tobacco: Never   Vaping Use   • Vaping Use: Never used   Substance and Sexual Activity   • Alcohol use: Yes     Comment: WEEKLY   • Drug use: Not Currently     Comment: Prescribed Alprazolam   • Sexual activity: Yes     Partners: Male     Birth control/protection: None      Objective     Vital Signs:   /70   Pulse 66   Temp 98.2 °F (36.8 °C)   Ht 172.7 cm (68\")   Wt 80.7 kg (178 lb)   SpO2 97%   BMI 27.06 kg/m²   Physical Exam  Constitutional:       General: She is not in acute distress.     Appearance: She is not ill-appearing.   HENT:      Head: Normocephalic.   Cardiovascular:      Rate and Rhythm: Regular rhythm.      Heart sounds: Normal heart sounds.   Pulmonary:      Effort: No respiratory distress.      Breath sounds: Normal breath sounds. No wheezing.   Neurological:      Mental Status: She is alert.        Result Review  Data Reviewed:{ Labs  Result Review  Imaging  Med Tab  Media :23}   The following data was reviewed by: Debbie De Jesus MD on 10/13/2022  Lab Results - Last 18 Months   Lab Units 07/12/22  1003 06/15/22  1950 04/04/22  0857   BUN mg/dL  --  12 14   CREATININE mg/dL  --  0.85 0.90 "   SODIUM mmol/L  --  141 144   POTASSIUM mmol/L  --  3.9 4.4   CHLORIDE mmol/L  --  104 102   CALCIUM mg/dL  --  9.7 9.9   ALBUMIN g/dL  --   --  4.4   BILIRUBIN mg/dL  --   --  0.7   ALK PHOS IU/L  --   --  72   AST (SGOT) IU/L  --   --  15   ALT (SGPT) IU/L  --   --  14   CHOLESTEROL mg/dL  --   --  186   TRIGLYCERIDES mg/dL  --   --  165*   HDL CHOL mg/dL  --   --  52   VLDL CHOLESTEROL JAYDEN mg/dL  --   --  29   LDL CHOL mg/dL  --   --  105*   HEMOGLOBIN A1C %  --   --  5.6   WBC x10E3/uL 6.7  --  6.8   RBC x10E6/uL 4.60  --  4.64   HEMATOCRIT % 41.6  --  41.6   MCV fL 90  --  90   MCH pg 30.2  --  29.7   TSH uIU/mL  --   --  0.911   INR   --  0.99  --               Current Outpatient Medications:   •  ALPRAZolam (XANAX) 0.5 MG tablet, Take 1 tablet by mouth At Night As Needed for Anxiety., Disp: 15 tablet, Rfl: 0  •  CALCIUM-MAGNESIUM-ZINC PO, Take 3 tablets by mouth Daily. 1000MG/400MG/25MG, Disp: , Rfl:   •  Cholecalciferol (Vitamin D3) 25 MCG (1000 UT) capsule, Take 1,000 Units by mouth Daily., Disp: , Rfl:   •  levothyroxine (Synthroid) 100 MCG tablet, Take 1 tablet by mouth Daily., Disp: 90 tablet, Rfl: 3  •  Omega-3 Fatty Acids (fish oil) 1000 MG capsule capsule, Take  by mouth Daily With Breakfast., Disp: , Rfl:   •  Omega-3 Fatty Acids (fish oil) 1200 MG capsule capsule, Take 2,400 mg by mouth Daily., Disp: , Rfl:   •  Probiotic Product (PROBIOTIC PO), Take 1 capsule by mouth Daily., Disp: , Rfl:   •  Unable to find, Take 0.5 each by mouth Every Night. Med Name: Delta 8 CBD gummy, Disp: , Rfl:   •  Eliquis 5 MG tablet tablet, TAKE 1 TABLET BY MOUTH TWICE A DAY, Disp: 60 tablet, Rfl: 1      Assessment and Plan {CC Problem List  Visit Diagnosis  ROS  Review (Popup)  Health Maintenance  Quality  BestPractice  Medications  SmartSets  SnapShot Encounters  Media :23}   Problem List Items Addressed This Visit        Cardiac and Vasculature    Hyperlipidemia       Coag and Thromboembolic    DVT (deep  venous thrombosis) (HCC)    Current Assessment & Plan     One episode June 2022  Taking eliquis, end of this month will be 4 months  Stop after this prescription  Discuss advise if re-occur treatment may be life long.         Other Visit Diagnoses     Need for influenza vaccination    -  Primary    Relevant Orders    FluLaval/Fluarix/Fluzone >6 Months (Completed)    Psychoactive substance dependence with current use (HCC)        Relevant Orders    Urine Drug Screen - Urine, Clean Catch (Completed)          Follow Up   Return in about 3 months (around 1/13/2023) for Recheck.  Patient was given instructions and counseling regarding her condition or for health maintenance advice. Please see specific information pulled into the AVS if appropriate.    EpicAct:MR_WS_AMB_ORDERS,RunParams:STARTUPTYPE=FOLLOW    MR_WS_AMB_DISCHARGE

## 2022-10-13 NOTE — ASSESSMENT & PLAN NOTE
One episode June 2022  Taking eliquis, end of this month will be 4 months  Stop after this prescription  Discuss advise if re-occur treatment may be life long.

## 2022-10-14 LAB
AMPHETAMINES UR QL SCN: NEGATIVE NG/ML
BARBITURATES UR QL SCN: NEGATIVE NG/ML
BENZODIAZ UR QL SCN: NEGATIVE NG/ML
BZE UR QL SCN: NEGATIVE NG/ML
CANNABINOIDS UR QL SCN: POSITIVE NG/ML
CREAT UR-MCNC: 125.2 MG/DL (ref 20–300)
LABORATORY COMMENT REPORT: ABNORMAL
METHADONE UR QL SCN: NEGATIVE NG/ML
OPIATES UR QL SCN: NEGATIVE NG/ML
OXYCODONE+OXYMORPHONE UR QL SCN: NEGATIVE NG/ML
PCP UR QL: NEGATIVE NG/ML
PH UR: 6.4 [PH] (ref 4.5–8.9)
PROPOXYPH UR QL SCN: NEGATIVE NG/ML

## 2022-10-15 DIAGNOSIS — I82.462 ACUTE DEEP VEIN THROMBOSIS (DVT) OF CALF MUSCLE VEIN OF LEFT LOWER EXTREMITY: ICD-10-CM

## 2022-10-17 RX ORDER — APIXABAN 5 MG/1
TABLET, FILM COATED ORAL
Qty: 60 TABLET | Refills: 1 | Status: SHIPPED | OUTPATIENT
Start: 2022-10-17

## 2022-10-26 ENCOUNTER — APPOINTMENT (OUTPATIENT)
Dept: WOMENS IMAGING | Facility: HOSPITAL | Age: 62
End: 2022-10-26

## 2022-10-26 PROCEDURE — 77063 BREAST TOMOSYNTHESIS BI: CPT | Performed by: RADIOLOGY

## 2022-10-26 PROCEDURE — 77067 SCR MAMMO BI INCL CAD: CPT | Performed by: RADIOLOGY

## 2022-11-11 DIAGNOSIS — F41.9 ANXIETY: ICD-10-CM

## 2022-11-11 NOTE — TELEPHONE ENCOUNTER
Rx Refill Note  Requested Prescriptions     Pending Prescriptions Disp Refills   • ALPRAZolam (XANAX) 0.5 MG tablet [Pharmacy Med Name: ALPRAZOLAM 0.5 MG TABLET] 15 tablet 0     Sig: TAKE 1 TABLET BY MOUTH AT NIGHT AS NEEDED FOR ANXIETY.      Last office visit with prescribing clinician: 10/13/2022      Next office visit with prescribing clinician: 1/13/2023     No contract on file  Last fill 7/26/22          Shruti Lara  11/11/22, 14:17 EST

## 2022-11-16 RX ORDER — ALPRAZOLAM 0.5 MG/1
0.5 TABLET ORAL NIGHTLY PRN
Qty: 15 TABLET | Refills: 0 | OUTPATIENT
Start: 2022-11-16

## 2022-11-28 DIAGNOSIS — F41.9 ANXIETY: ICD-10-CM

## 2022-11-29 RX ORDER — ALPRAZOLAM 0.5 MG/1
0.5 TABLET ORAL NIGHTLY PRN
Qty: 15 TABLET | Refills: 0 | Status: SHIPPED | OUTPATIENT
Start: 2022-11-29

## 2023-05-12 RX ORDER — LEVOTHYROXINE SODIUM 100 MCG
TABLET ORAL
Qty: 90 TABLET | Refills: 0 | Status: SHIPPED | OUTPATIENT
Start: 2023-05-12

## 2023-05-12 NOTE — TELEPHONE ENCOUNTER
Rx Refill Note  Requested Prescriptions     Pending Prescriptions Disp Refills   • Synthroid 100 MCG tablet [Pharmacy Med Name: SYNTHROID TAB 100MCG] 90 tablet 3     Sig: TAKE 1 TABLET DAILY      Last office visit with prescribing clinician: 10/13/2022   Last telemedicine visit with prescribing clinician: 11/28/2022   Next office visit with prescribing clinician: Visit date not found                         Would you like a call back once the refill request has been completed: [] Yes [] No    If the office needs to give you a call back, can they leave a voicemail: [] Yes [] No    Radha Gonzalez MA  05/12/23, 11:51 EDT

## 2023-07-21 PROBLEM — N18.2 CKD (CHRONIC KIDNEY DISEASE) STAGE 2, GFR 60-89 ML/MIN: Chronic | Status: ACTIVE | Noted: 2022-04-04

## 2023-07-21 PROBLEM — I82.409 DVT (DEEP VENOUS THROMBOSIS): Status: RESOLVED | Noted: 2022-07-12 | Resolved: 2023-07-21

## 2023-08-14 RX ORDER — LEVOTHYROXINE SODIUM 100 MCG
TABLET ORAL
Qty: 90 TABLET | Refills: 0 | Status: SHIPPED | OUTPATIENT
Start: 2023-08-14

## 2023-10-02 RX ORDER — LEVOTHYROXINE SODIUM 100 MCG
TABLET ORAL
Qty: 90 TABLET | Refills: 0 | Status: SHIPPED | OUTPATIENT
Start: 2023-10-02

## 2023-10-27 ENCOUNTER — APPOINTMENT (OUTPATIENT)
Dept: WOMENS IMAGING | Facility: HOSPITAL | Age: 63
End: 2023-10-27
Payer: COMMERCIAL

## 2023-10-27 PROCEDURE — 77063 BREAST TOMOSYNTHESIS BI: CPT | Performed by: RADIOLOGY

## 2023-10-27 PROCEDURE — 77067 SCR MAMMO BI INCL CAD: CPT | Performed by: RADIOLOGY

## 2023-11-20 ENCOUNTER — TELEPHONE (OUTPATIENT)
Dept: INTERNAL MEDICINE | Facility: CLINIC | Age: 63
End: 2023-11-20
Payer: COMMERCIAL

## 2023-11-20 DIAGNOSIS — F41.9 ANXIETY: ICD-10-CM

## 2023-11-20 RX ORDER — ALPRAZOLAM 0.5 MG/1
0.5 TABLET ORAL NIGHTLY PRN
Qty: 15 TABLET | Refills: 1 | Status: SHIPPED | OUTPATIENT
Start: 2023-11-20

## 2023-11-20 NOTE — TELEPHONE ENCOUNTER
PATIENT CALLED FOR NEW PRESCRIPTION FOR    ALPRAZolam (XANAX) 0.5 MG tablet   SHE HAS 3 TABLETS LEFT.    THIS WILL BE A NEW PRESCRIPTION FOR DR. BONILLA.    [facility]/pharmacy #[id number] - [place], [place] - [address] AT [facility] - [phone]  - [phone]  [phone]     CALL BACK NUMBER [phone]

## 2023-11-20 NOTE — TELEPHONE ENCOUNTER
Rx Refill Note  Requested Prescriptions     Pending Prescriptions Disp Refills    ALPRAZolam (XANAX) 0.5 MG tablet 15 tablet 0     Sig: Take 1 tablet by mouth At Night As Needed for Anxiety.      Last office visit with prescribing clinician: 7/21/2023   Last telemedicine visit with prescribing clinician: Visit date not found   Next office visit with prescribing clinician: 1/24/2024     Timothy Brian MA  11/20/23, 13:04 EST     No

## 2024-01-12 RX ORDER — LEVOTHYROXINE SODIUM 0.1 MG/1
100 TABLET ORAL DAILY
Qty: 90 TABLET | Refills: 0 | Status: SHIPPED | OUTPATIENT
Start: 2024-01-12

## 2024-01-12 NOTE — TELEPHONE ENCOUNTER
Caller: Ivonne Camara    Relationship: Self    Best call back number: [phone]     Requested Prescriptions:   Requested Prescriptions     Pending Prescriptions Disp Refills    levothyroxine (Synthroid) 100 MCG tablet 90 tablet 0     Sig: Take 1 tablet by mouth Daily.        [facility] [address] - [address] [facility] AT [address] TO [facility] - [phone]  - [phone] FX     Last office visit with prescribing clinician: 7/21/2023   Last telemedicine visit with prescribing clinician: Visit date not found   Next office visit with prescribing clinician: 1/24/2024     Does the patient have less than a 3 day supply:  [] Yes  [x] No    Would you like a call back once the refill request has been completed: [] Yes [x] No    If the office needs to give you a call back, can they leave a voicemail: [] Yes [x] No    Finesse Wood Rep   01/12/24 08:55 EST

## 2024-01-26 ENCOUNTER — OFFICE VISIT (OUTPATIENT)
Dept: INTERNAL MEDICINE | Facility: CLINIC | Age: 64
End: 2024-01-26
Payer: COMMERCIAL

## 2024-01-26 VITALS
BODY MASS INDEX: 21.98 KG/M2 | RESPIRATION RATE: 18 BRPM | HEART RATE: 74 BPM | DIASTOLIC BLOOD PRESSURE: 88 MMHG | WEIGHT: 145 LBS | SYSTOLIC BLOOD PRESSURE: 138 MMHG | OXYGEN SATURATION: 98 % | HEIGHT: 68 IN

## 2024-01-26 DIAGNOSIS — M85.89 OSTEOPENIA OF MULTIPLE SITES: ICD-10-CM
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
## 2024-01-26 DIAGNOSIS — R73.01 IFG (IMPAIRED FASTING GLUCOSE): ICD-10-CM

## 2024-01-26 DIAGNOSIS — F41.9 ANXIETY: Primary | Chronic | ICD-10-CM

## 2024-01-26 DIAGNOSIS — E78.2 MIXED HYPERLIPIDEMIA: Chronic | ICD-10-CM

## 2024-01-26 DIAGNOSIS — E03.9 HYPOTHYROIDISM, ADULT: Chronic | ICD-10-CM

## 2024-01-26 DIAGNOSIS — M53.3 SACRAL BACK PAIN: ICD-10-CM

## 2024-01-26 PROBLEM — Z02.89 MEDICATION MANAGEMENT CONTRACT AGREEMENT: Status: ACTIVE | Noted: 2024-01-26

## 2024-01-26 PROBLEM — Z00.00 ROUTINE ADULT HEALTH MAINTENANCE: Status: RESOLVED | Noted: 2022-04-04 | Resolved: 2024-01-26

## 2024-01-26 RX ORDER — METHYLPREDNISOLONE 4 MG/1
TABLET ORAL
Qty: 21 TABLET | Refills: 0 | Status: SHIPPED | OUTPATIENT
Start: 2024-01-26

## 2024-01-26 NOTE — PROGRESS NOTES
Chief Complaint  Follow-up and Pain (Hip bone )    Subjective        Ivonne Camara presents to [facility]  History of Present Illness    She is here today to follow-up for the following chronic issues:    Anxiety secondary to thunderstorms-stable.  Patient is taking alprazolam PRN for severe thunderstorms.  The patient is not having any side effects.  PDMP reviewed and appropriate.     Hypothyroidism -clinically stable.  Patient taking levothyroxine.  Tolerating well.  Due for recheck of lab.     She has osteopenia of multiple sites and takes Vitamin D 1000 IU daily.  Due for repeat DEXA.    HLD-  Patient taking no meds other than omega FA.  Trying to adhere to a balanced diet.  Denies side effects such as myalgias.  Due for recheck.    Occasionally she will have some impaired fasting glucose but based on CMP not prediabetic range.  I do not see a recent A1c.    She would also like to address some pain in her hip area. She had a fall in her basement after getting a mouse jumping on her.  She notices the pain just to the left of her spine around the sacral area.  She is currently seeing a chiropractor for the pain with some benefit.      Current Outpatient Medications:     ALPRAZolam (XANAX) 0.5 MG tablet, Take 1 tablet by mouth At Night As Needed for Anxiety., Disp: 15 tablet, Rfl: 1    CALCIUM-MAGNESIUM-ZINC PO, Take 3 tablets by mouth Daily. 1000MG/400MG/25MG, Disp: , Rfl:     Cholecalciferol (Vitamin D3) 25 MCG (1000 UT) capsule, Take 1 capsule by mouth Daily., Disp: , Rfl:     levothyroxine (Synthroid) 100 MCG tablet, Take 1 tablet by mouth Daily., Disp: 90 tablet, Rfl: 0    Omega-3 Fatty Acids (fish oil) 1000 MG capsule capsule, Take  by mouth Daily With Breakfast., Disp: , Rfl:     Omega-3 Fatty Acids (fish oil) 1200 MG capsule capsule, Take 2 capsules by mouth Daily., Disp: , Rfl:     Probiotic Product (PROBIOTIC PO), Take 1 capsule by mouth Daily., Disp: , Rfl:     Unable to  "find, Take 0.5 each by mouth Every Night. Med Name: Delta 8 CBD gummy, Disp: , Rfl:     methylPREDNISolone (MEDROL) 4 MG dose pack, Take as directed on package instructions., Disp: 21 tablet, Rfl: 0      Objective   Vital Signs:  /88 (BP Location: Left arm, Patient Position: Sitting, Cuff Size: Small Adult)   Pulse 74   Resp 18   Ht 172.7 cm (68\")   Wt 65.8 kg (145 lb)   SpO2 98%   BMI 22.05 kg/m²   Estimated body mass index is 22.05 kg/m² as calculated from the following:    Height as of this encounter: 172.7 cm (68\").    Weight as of this encounter: 65.8 kg (145 lb).       BMI is within normal parameters. No other follow-up for BMI required.      Physical Exam  Vitals and nursing note reviewed.   Constitutional:       General: She is not in acute distress.     Appearance: Normal appearance.   Cardiovascular:      Rate and Rhythm: Normal rate and regular rhythm.      Heart sounds: Normal heart sounds. No murmur heard.  Pulmonary:      Effort: Pulmonary effort is normal.      Breath sounds: Normal breath sounds.   Neurological:      Mental Status: She is alert.        Result Review :    The following data was reviewed by: Martir Jim MD on 01/26/2024:  Common labs          7/24/2023    09:14   Common Labs   Glucose 102    BUN 13    Creatinine 0.87    Sodium 140    Potassium 4.4    Chloride 102    Calcium 9.6    Total Protein 6.8    Albumin 4.4    Total Bilirubin 0.8    Alkaline Phosphatase 69    AST (SGOT) 15    ALT (SGPT) 15    WBC 6.7    Hemoglobin 13.7    Hematocrit 42.0    Platelets 293    Total Cholesterol 184    Triglycerides 92    HDL Cholesterol 60    LDL Cholesterol  107                   Assessment and Plan     Diagnoses and all orders for this visit:    1. Anxiety (Primary)    2. Hypothyroidism, adult  -     TSH Rfx On Abnormal To Free T4; Future    3. Mixed hyperlipidemia  -     CBC (No Diff); Future  -     Comprehensive Metabolic Panel; Future  -     Lipid Panel With LDL / HDL Ratio; " Future    4. Osteopenia of multiple sites  -     Comprehensive Metabolic Panel; Future  -     Vitamin D,25-Hydroxy; Future    5. IFG (impaired fasting glucose)  -     Hemoglobin A1c; Future    6. Injury due to fall, initial encounter    7. Sacral back pain  -     methylPREDNISolone (MEDROL) 4 MG dose pack; Take as directed on package instructions.  Dispense: 21 tablet; Refill: 0        Clinically stable chronic conditions as above.  Continue all medications as above.  Labs reviewed/ordered as above.  I will go ahead and give her a Medrol pack to see if I can get the pain to speed up with healing.  After that she can use NSAIDs as needed and chiropractic care.  If she continues to have problems, we could consider a orthopedic referral for possible injection into the sacral area.           Follow Up     Return in about 6 months (around 7/26/2024) for Annual physical.  Patient was given instructions and counseling regarding her condition or for health maintenance advice. Please see specific information pulled into the AVS if appropriate.

## 2024-02-02 ENCOUNTER — TELEPHONE (OUTPATIENT)
Dept: INTERNAL MEDICINE | Facility: CLINIC | Age: 64
End: 2024-02-02
Payer: COMMERCIAL

## 2024-02-02 NOTE — TELEPHONE ENCOUNTER
This could be a formulary issue. Please contact patient and advise we can send generic levothyroxine if she wishes to lower cost. She will need to s/w her insurance to find out why the tier changed.

## 2024-02-02 NOTE — TELEPHONE ENCOUNTER
Caller: Ivonne Camara    Relationship: Self    Best call back number: [phone]    Who are you requesting to speak with (clinical staff, provider,  specific staff member): CLINICAL STAFF    What was the call regarding: PATIENT STATED THAT THEY FINALLY GOT THEIR levothyroxine (Synthroid) 100 MCG tablet AND THEY ARE BEING TOLD THAT THE MEDICATION IS $180 FOR THE 90 DAY SUPPLY INSTEAD OF THE NORMAL $25. STATED THAT THE PHARMACY HAD TOLD THEM THE MEDICATION WAS A TIER 2 INSTEAD OF A TIER 1. STATED THAT THEY ARE GOING TO REACH OUT TO THE INSURANCE COMPANY ABOUT THIS AS WELL BUT WANTED TO SEE IF DR. BONILLA WOULD KNOW WHY THIS IS. PLEASE CALL AND ADVISE FURTHER

## 2024-02-05 NOTE — TELEPHONE ENCOUNTER
Caller: Ivonne Camara    Relationship to patient: Self    Best call back number: [phone]    Patient is needing: SHE GOT IT FIGURED OUT.  NO NEED TO CALL

## 2024-04-16 RX ORDER — LEVOTHYROXINE SODIUM 100 MCG
100 TABLET ORAL DAILY
Qty: 90 TABLET | Refills: 1 | Status: SHIPPED | OUTPATIENT
Start: 2024-04-16

## 2024-06-14 DIAGNOSIS — F41.9 ANXIETY: ICD-10-CM

## 2024-06-17 ENCOUNTER — TELEPHONE (OUTPATIENT)
Dept: INTERNAL MEDICINE | Facility: CLINIC | Age: 64
End: 2024-06-17
Payer: COMMERCIAL

## 2024-06-17 RX ORDER — ALPRAZOLAM 0.5 MG/1
0.5 TABLET ORAL NIGHTLY PRN
Qty: 15 TABLET | Refills: 1 | Status: SHIPPED | OUTPATIENT
Start: 2024-06-17

## 2024-06-17 NOTE — TELEPHONE ENCOUNTER
Caller: Ivonne Camara    Relationship: Self    Best call back number: [phone]     Who are you requesting to speak with (clinical staff, provider,  specific staff member):       What was the call regarding: PATIENT IS A PATIENT OF DR. BONILLA AND WANTS TO MOVE DR. JEFFRIES WHEN HER SCHEDULE OPENS.  SHE HAS AN APPOINTMENT WITH DR. BONILLA ON 8/28 AND WOULD LIKE TO MOVE IT TO DR. JEFFRIES.  PLEASE CALL HER TO CONFIRM.

## 2024-07-10 ENCOUNTER — TELEPHONE (OUTPATIENT)
Dept: INTERNAL MEDICINE | Facility: CLINIC | Age: 64
End: 2024-07-10
Payer: COMMERCIAL

## 2024-07-10 NOTE — TELEPHONE ENCOUNTER
Lvm with new appt with dr braun per patient's request   Eye Protection Verbiage: Before proceeding with the stage, a plastic scleral shield was inserted. The globe was anesthetized with a few drops of 1% lidocaine with 1:100,000 epinephrine. Then, an appropriate sized scleral shield was chosen and coated with lacrilube ointment. The shield was gently inserted and left in place for the duration of each stage. After the stage was completed, the shield was gently removed.

## 2024-08-28 ENCOUNTER — OFFICE VISIT (OUTPATIENT)
Dept: INTERNAL MEDICINE | Facility: CLINIC | Age: 64
End: 2024-08-28
Payer: COMMERCIAL

## 2024-08-28 VITALS
BODY MASS INDEX: 27.95 KG/M2 | SYSTOLIC BLOOD PRESSURE: 124 MMHG | DIASTOLIC BLOOD PRESSURE: 72 MMHG | OXYGEN SATURATION: 98 % | WEIGHT: 184.4 LBS | HEIGHT: 68 IN | RESPIRATION RATE: 18 BRPM | HEART RATE: 71 BPM

## 2024-08-28 DIAGNOSIS — R73.01 IFG (IMPAIRED FASTING GLUCOSE): ICD-10-CM

## 2024-08-28 DIAGNOSIS — Z02.89 MEDICATION MANAGEMENT CONTRACT AGREEMENT: ICD-10-CM

## 2024-08-28 DIAGNOSIS — F41.9 ANXIETY: Chronic | ICD-10-CM

## 2024-08-28 DIAGNOSIS — Z02.89 MEDICATION MANAGEMENT CONTRACT AGREEMENT: Primary | ICD-10-CM

## 2024-08-28 DIAGNOSIS — E03.9 HYPOTHYROIDISM, ADULT: Primary | Chronic | ICD-10-CM

## 2024-08-28 PROCEDURE — 99214 OFFICE O/P EST MOD 30 MIN: CPT | Performed by: STUDENT IN AN ORGANIZED HEALTH CARE EDUCATION/TRAINING PROGRAM

## 2024-08-28 NOTE — PROGRESS NOTES
"Chief Complaint  Establish Care and Hypothyroidism (Due for TSH )    Subjective        Ivonne Camara presents to [facility]  History of Present Illness  This is a 64-year-old female with chronic medical conditions of hypothyroidism and impaired fasting glucose here to establish care.   She has no concerns today reports she is doing well.     Hypothyroidism: Is maintained on levothyroxine 100 mcg.  We reviewed appropriate thyroid medication administration which she is doing.  She denies significant skin changes changes in bowel or bladder.  Her weight has been stable (last office visit reported weight was erroneous)  Situational anxiety: She seldomly requires small doses of alprazolam regarding underlying storm anxiety.      She follows with [facility] and had a DEXA scan in December 2022 that showed low bone mass but no evidence of osteopenia or osteoporosis.      Objective   Vital Signs:  /72 (BP Location: Left arm, Patient Position: Sitting, Cuff Size: Adult)   Pulse 71   Resp 18   Ht 172.7 cm (67.99\")   Wt 83.6 kg (184 lb 6.4 oz)   SpO2 98%   BMI 28.04 kg/m²   Estimated body mass index is 28.04 kg/m² as calculated from the following:    Height as of this encounter: 172.7 cm (67.99\").    Weight as of this encounter: 83.6 kg (184 lb 6.4 oz).    BMI is within normal parameters. No other follow-up for BMI required.      Physical Exam  Vitals and nursing note reviewed.   Constitutional:       General: She is not in acute distress.     Appearance: Normal appearance.   Cardiovascular:      Rate and Rhythm: Normal rate and regular rhythm.   Pulmonary:      Effort: Pulmonary effort is normal.      Breath sounds: Normal breath sounds.   Skin:     General: Skin is warm and dry.   Neurological:      Mental Status: She is alert and oriented to person, place, and time.   Psychiatric:         Mood and Affect: Mood normal.         Thought Content: Thought content normal.         " Judgment: Judgment normal.        Result Review :  The following data was reviewed by: Tasha Enamorado MD on 08/28/2024:  July 2023 labs            Assessment and Plan   Diagnoses and all orders for this visit:    1. Hypothyroidism, adult (Primary)  Comments:  Last TSH in July 2023 was normal  Will review labs and advise  Orders:  -     Cancel: Hemoglobin A1c  -     Cancel: CBC & Differential  -     Cancel: TSH Rfx On Abnormal To Free T4  -     CBC & Differential; Future  -     Hemoglobin A1c; Future  -     TSH Rfx On Abnormal To Free T4; Future    2. IFG (impaired fasting glucose)  Comments:  Review repeat labs and advise  Orders:  -     Cancel: Hemoglobin A1c  -     Cancel: Lipid Panel With / Chol / HDL Ratio  -     Cancel: Comprehensive Metabolic Panel  -     Comprehensive Metabolic Panel; Future  -     Hemoglobin A1c; Future  -     Lipid Panel With / Chol / HDL Ratio; Future    3. Anxiety  Comments:  Situational; well-maintained with rare Xanax    4. Medication management contract agreement  Comments:  Contract reviewed and signed today and patient given a copy for her records.  Orders:  -     Cancel: COMPLIANCE DRUG ANALYSIS, NO THC -    Patient not fasting today so will return for labs at her convenience in the near future while fasting, as a lipid panel as needed.    We discussed annual flu and COVID vaccines which the patient plans to pursue.  Regarding shingles vaccine, the patient has had negative serologic testing for varicella virus.         Follow Up   Return in about 8 months (around 4/28/2025) for Annual physical.  Patient was given instructions and counseling regarding her condition or for health maintenance advice. Please see specific information pulled into the AVS if appropriate.

## 2024-09-09 ENCOUNTER — TELEPHONE (OUTPATIENT)
Dept: INTERNAL MEDICINE | Facility: CLINIC | Age: 64
End: 2024-09-09
Payer: COMMERCIAL

## 2024-09-09 NOTE — TELEPHONE ENCOUNTER
----- Message from Tasha Enamorado sent at 9/9/2024  7:33 AM EDT -----  Good morning Mrs. Camara,    Great to meet you in clinic a couple weeks ago.  Your labs came back and overall look great. There is no evidence of anemia.  Electrolytes, kidney function, and liver function are normal.  Thyroid function is normal.  A1c, looking at blood sugars over 3 months, meets the threshold for prediabetes.  This is something with that we can monitor.  Cholesterol panel is overall stable and does not require any medication change.  Your ASCVD score, your 10-year risk of a heart attack or stroke, is low and does not require any medication based on this cholesterol.    Please let me know if you have any other questions or concerns regarding these labs.  Take care,  Dr. Enamorado    The 10-year ASCVD risk score (Riana ORTIZ, et al., 2019) is: 4.2%    Values used to calculate the score:      Age: 64 years      Sex: Female      Is Non- : No      Diabetic: No      Tobacco smoker: No      Systolic Blood Pressure: 124 mmHg      Is BP treated: No      HDL Cholesterol: 65 mg/dL      Total Cholesterol: 225 mg/dL

## 2024-09-09 NOTE — TELEPHONE ENCOUNTER
Spoke with Pt regarding results and recommendations.  Pt states she will work on a healthier diet and exercise more.  No questions or concerns at this time.  [facility] TAN

## 2024-10-04 RX ORDER — LEVOTHYROXINE SODIUM 100 UG/1
100 TABLET ORAL DAILY
Qty: 90 TABLET | Refills: 1 | Status: SHIPPED | OUTPATIENT
Start: 2024-10-04

## 2024-10-04 NOTE — TELEPHONE ENCOUNTER
Caller: Ivonne Camara    Relationship: Self    Best call back number: [phone]    Requested Prescriptions:   Requested Prescriptions     Pending Prescriptions Disp Refills    levothyroxine (Synthroid) 100 MCG tablet 90 tablet 1     Sig: Take 1 tablet by mouth Daily.        [facility] [address] - [address] [facility] [address] [facility] - [phone]  - [phone] FX     Last office visit with prescribing clinician: 8/28/2024   Last telemedicine visit with prescribing clinician: Visit date not found   Next office visit with prescribing clinician: 4/7/2025     Additional details provided by patient: STATED THAT THEY ARE NEEDING TO GET THIS SENT IN UNDER DR. SCHNEIDER AS THE PHARMACY IS TRYING TO GET IT FILLED UNDER DR. BONILLA STILL    Does the patient have less than a 3 day supply:  [] Yes  [x] No    Would you like a call back once the refill request has been completed: [x] Yes [] No    If the office needs to give you a call back, can they leave a voicemail: [x] Yes [] No    Finsese Murdock Rep   10/04/24 08:39 EDT

## 2024-10-28 ENCOUNTER — APPOINTMENT (OUTPATIENT)
Dept: WOMENS IMAGING | Facility: HOSPITAL | Age: 64
End: 2024-10-28
Payer: COMMERCIAL

## 2024-10-28 PROCEDURE — 77063 BREAST TOMOSYNTHESIS BI: CPT | Performed by: RADIOLOGY

## 2024-10-28 PROCEDURE — 77067 SCR MAMMO BI INCL CAD: CPT | Performed by: RADIOLOGY

## 2025-03-13 ENCOUNTER — APPOINTMENT (OUTPATIENT)
Dept: GENERAL RADIOLOGY | Facility: HOSPITAL | Age: 65
End: 2025-03-13
Payer: COMMERCIAL

## 2025-03-13 ENCOUNTER — HOSPITAL ENCOUNTER (EMERGENCY)
Facility: HOSPITAL | Age: 65
Discharge: HOME | End: 2025-03-13
Attending: EMERGENCY MEDICINE
Payer: COMMERCIAL

## 2025-03-13 VITALS
SYSTOLIC BLOOD PRESSURE: 133 MMHG | TEMPERATURE: 98.4 F | DIASTOLIC BLOOD PRESSURE: 80 MMHG | RESPIRATION RATE: 20 BRPM | OXYGEN SATURATION: 96 % | HEART RATE: 63 BPM

## 2025-03-13 DIAGNOSIS — R07.89 CHEST DISCOMFORT: ICD-10-CM

## 2025-03-13 DIAGNOSIS — M25.472 EDEMA OF LEFT ANKLE: Primary | ICD-10-CM

## 2025-03-13 LAB
ALBUMIN SERPL-MCNC: 4 G/DL (ref 3.5–5.2)
ALBUMIN/GLOB SERPL: 1.3 G/DL
ALP SERPL-CCNC: 68 U/L (ref 39–117)
ALT SERPL W P-5'-P-CCNC: 14 U/L (ref 1–33)
ANION GAP SERPL CALCULATED.3IONS-SCNC: 12.9 MMOL/L (ref 5–15)
AST SERPL-CCNC: 22 U/L (ref 1–32)
BASOPHILS # BLD AUTO: 0.04 10*3/MM3 (ref 0–0.2)
BASOPHILS NFR BLD AUTO: 0.5 % (ref 0–1.5)
BILIRUB SERPL-MCNC: 0.6 MG/DL (ref 0–1.2)
BUN SERPL-MCNC: 12 MG/DL (ref 8–23)
BUN/CREAT SERPL: 11.2 (ref 7–25)
CALCIUM SPEC-SCNC: 9.5 MG/DL (ref 8.6–10.5)
CHLORIDE SERPL-SCNC: 103 MMOL/L (ref 98–107)
CO2 SERPL-SCNC: 24.1 MMOL/L (ref 22–29)
CREAT SERPL-MCNC: 1.07 MG/DL (ref 0.57–1)
D DIMER PPP FEU-MCNC: 0.34 MCGFEU/ML (ref 0–0.63)
DEPRECATED RDW RBC AUTO: 39.9 FL (ref 37–54)
EGFRCR SERPLBLD CKD-EPI 2021: 58.5 ML/MIN/1.73
EOSINOPHIL # BLD AUTO: 0.07 10*3/MM3 (ref 0–0.4)
EOSINOPHIL NFR BLD AUTO: 0.8 % (ref 0.3–6.2)
ERYTHROCYTE [DISTWIDTH] IN BLOOD BY AUTOMATED COUNT: 12.2 % (ref 12.3–15.4)
GEN 5 1HR TROPONIN T REFLEX: 9 NG/L
GLOBULIN UR ELPH-MCNC: 3 GM/DL
GLUCOSE SERPL-MCNC: 134 MG/DL (ref 65–99)
HCT VFR BLD AUTO: 42.2 % (ref 34–46.6)
HGB BLD-MCNC: 14.1 G/DL (ref 12–15.9)
HOLD SPECIMEN: NORMAL
HOLD SPECIMEN: NORMAL
IMM GRANULOCYTES # BLD AUTO: 0.03 10*3/MM3 (ref 0–0.05)
IMM GRANULOCYTES NFR BLD AUTO: 0.3 % (ref 0–0.5)
LYMPHOCYTES # BLD AUTO: 3.16 10*3/MM3 (ref 0.7–3.1)
LYMPHOCYTES NFR BLD AUTO: 36.6 % (ref 19.6–45.3)
MCH RBC QN AUTO: 29.9 PG (ref 26.6–33)
MCHC RBC AUTO-ENTMCNC: 33.4 G/DL (ref 31.5–35.7)
MCV RBC AUTO: 89.4 FL (ref 79–97)
MONOCYTES # BLD AUTO: 0.54 10*3/MM3 (ref 0.1–0.9)
MONOCYTES NFR BLD AUTO: 6.3 % (ref 5–12)
NEUTROPHILS NFR BLD AUTO: 4.79 10*3/MM3 (ref 1.7–7)
NEUTROPHILS NFR BLD AUTO: 55.5 % (ref 42.7–76)
NRBC BLD AUTO-RTO: 0 /100 WBC (ref 0–0.2)
PLATELET # BLD AUTO: 309 10*3/MM3 (ref 140–450)
PMV BLD AUTO: 9.8 FL (ref 6–12)
POTASSIUM SERPL-SCNC: 3.5 MMOL/L (ref 3.5–5.2)
PROT SERPL-MCNC: 7 G/DL (ref 6–8.5)
RBC # BLD AUTO: 4.72 10*6/MM3 (ref 3.77–5.28)
SODIUM SERPL-SCNC: 140 MMOL/L (ref 136–145)
TROPONIN T NUMERIC DELTA: 0 NG/L
TROPONIN T SERPL HS-MCNC: 9 NG/L
WBC NRBC COR # BLD AUTO: 8.63 10*3/MM3 (ref 3.4–10.8)
WHOLE BLOOD HOLD COAG: NORMAL
WHOLE BLOOD HOLD SPECIMEN: NORMAL

## 2025-03-13 PROCEDURE — 36415 COLL VENOUS BLD VENIPUNCTURE: CPT

## 2025-03-13 PROCEDURE — 85025 COMPLETE CBC W/AUTO DIFF WBC: CPT | Performed by: EMERGENCY MEDICINE

## 2025-03-13 PROCEDURE — 84484 ASSAY OF TROPONIN QUANT: CPT | Performed by: EMERGENCY MEDICINE

## 2025-03-13 PROCEDURE — 99284 EMERGENCY DEPT VISIT MOD MDM: CPT

## 2025-03-13 PROCEDURE — 85379 FIBRIN DEGRADATION QUANT: CPT | Performed by: EMERGENCY MEDICINE

## 2025-03-13 PROCEDURE — 93005 ELECTROCARDIOGRAM TRACING: CPT

## 2025-03-13 PROCEDURE — 80053 COMPREHEN METABOLIC PANEL: CPT | Performed by: EMERGENCY MEDICINE

## 2025-03-13 PROCEDURE — 71045 X-RAY EXAM CHEST 1 VIEW: CPT

## 2025-03-13 PROCEDURE — 93010 ELECTROCARDIOGRAM REPORT: CPT | Performed by: STUDENT IN AN ORGANIZED HEALTH CARE EDUCATION/TRAINING PROGRAM

## 2025-03-13 PROCEDURE — 93005 ELECTROCARDIOGRAM TRACING: CPT | Performed by: EMERGENCY MEDICINE

## 2025-03-13 RX ORDER — SODIUM CHLORIDE 0.9 % (FLUSH) 0.9 %
10 SYRINGE (ML) INJECTION AS NEEDED
Status: DISCONTINUED | OUTPATIENT
Start: 2025-03-13 | End: 2025-03-14 | Stop reason: HOSPADM

## 2025-03-13 RX ORDER — ASPIRIN 325 MG
325 TABLET ORAL ONCE
Status: COMPLETED | OUTPATIENT
Start: 2025-03-13 | End: 2025-03-13

## 2025-03-13 RX ADMIN — ASPIRIN 325 MG ORAL TABLET 325 MG: 325 PILL ORAL at 20:57

## 2025-03-14 LAB
QT INTERVAL: 366 MS
QTC INTERVAL: 453 MS

## 2025-03-14 NOTE — ED PROVIDER NOTES
EMERGENCY DEPARTMENT ENCOUNTER    Room Number:  13/13  PCP: Tasha Enamorado MD  Historian: Patient      HPI:  Chief Complaint: Chest tightness, left ankle swelling  A complete HPI/ROS/PMH/PSH/SH/FH are unobtainable due to: None    Context: Ivonne Camara is a 64 y.o. female who presents to the ED via private vehicle out of concerns for some chest discomfort, described more as a tightness, and some mild left ankle swelling that she noted earlier today.  Has a history of DVT.  No recent travel or recent surgeries.  No recent trauma.  Did get a massage yesterday on the left leg.  Not currently on anticoagulation.  No personal history of coronary disease but does have a primary family history with her brother of coronary disease.  No active chest discomfort now.  No associated shortness of breath, nausea, diaphoresis.      MEDICAL RECORD REVIEW    External (non-ED) record review: No prior cardiac workup noted on chart review in epic              PAST MEDICAL HISTORY  Active Ambulatory Problems     Diagnosis Date Noted    Anxiety 03/28/2016    Hypothyroidism, adult     Hyperlipidemia     Osteopenia of multiple sites 01/26/2024    Medication management contract agreement 01/26/2024    IFG (impaired fasting glucose) 01/26/2024     Resolved Ambulatory Problems     Diagnosis Date Noted    Renal calculi     Routine adult health maintenance 04/04/2022    DVT (deep venous thrombosis) 07/12/2022     Past Medical History:   Diagnosis Date    Blood clot in vein 06/2022    Hypercholesterolemia     Hypothyroidism     Insomnia     Stress incontinence          PAST SURGICAL HISTORY  Past Surgical History:   Procedure Laterality Date    BREAST EXCISIONAL BIOPSY Left     COLONOSCOPY N/A 9/27/2022    Procedure: COLONOSCOPY to cecum/TI with cold biopsies;  Surgeon: Claudia Crook MD;  Location: [facility] ENDOSCOPY;  Service: Gastroenterology;  Laterality: N/A;  pre- screening  post- ulcers to ileum and colon, hemorrhoids    KIDNEY  STONE SURGERY      RUPTURED URETER AND STENT PLACEMENT    TONSILLECTOMY      WISDOM TOOTH EXTRACTION           FAMILY HISTORY  Family History   Problem Relation Age of Onset    Dementia Mother     Stroke Mother     Other Sister         substance abuse    Heart disease Brother         age 62, aortic dissection    Malig Hyperthermia Neg Hx          SOCIAL HISTORY  Social History     Socioeconomic History    Marital status:    Tobacco Use    Smoking status: Former     Current packs/day: 0.00     Average packs/day: 0.5 packs/day for 10.0 years (5.0 ttl pk-yrs)     Types: Cigarettes     Start date:      Quit date:      Years since quittin.2    Smokeless tobacco: Never   Vaping Use    Vaping status: Never Used   Substance and Sexual Activity    Alcohol use: Yes     Comment: WEEKLY    Drug use: Not Currently     Comment: Prescribed Alprazolam    Sexual activity: Yes     Partners: Male     Birth control/protection: None         ALLERGIES  Patient has no known allergies.        REVIEW OF SYSTEMS  Review of Systems     All systems reviewed and negative except for those discussed in HPI.       PHYSICAL EXAM    I have reviewed the triage vital signs and nursing notes.    ED Triage Vitals   Temp Heart Rate Resp BP SpO2   25   98.4 °F (36.9 °C) 95 20 163/94 96 %      Temp src Heart Rate Source Patient Position BP Location FiO2 (%)   25 -- -- -- --   Tympanic           Physical Exam  General: No acute distress, nontoxic  HEENT: Mucous membranes moist, atraumatic, EOMI  Neck: Full ROM  Pulm: Symmetric chest rise, nonlabored, lungs CTAB  Cardiovascular: Regular rate and rhythm, intact distal pulses, trace edema at the lateral malleolus on the left without overlying erythema or ecchymosis  GI: Soft, nontender, nondistended, no rebound, no guarding, bowel sounds present  MSK: Full ROM, no deformity  Skin: Warm, dry  Neuro: Awake, alert,  oriented x 4, GCS 15, moving all extremities, no focal deficits  Psych: Calm, cooperative          LAB RESULTS  Recent Results (from the past 24 hours)   ECG 12 Lead ED Triage Standing Order; Chest Pain    Collection Time: 03/13/25  8:40 PM   Result Value Ref Range    QT Interval 366 ms    QTC Interval 453 ms   Comprehensive Metabolic Panel    Collection Time: 03/13/25  8:44 PM    Specimen: Arm, Right; Blood   Result Value Ref Range    Glucose 134 (H) 65 - 99 mg/dL    BUN 12 8 - 23 mg/dL    Creatinine 1.07 (H) 0.57 - 1.00 mg/dL    Sodium 140 136 - 145 mmol/L    Potassium 3.5 3.5 - 5.2 mmol/L    Chloride 103 98 - 107 mmol/L    CO2 24.1 22.0 - 29.0 mmol/L    Calcium 9.5 8.6 - 10.5 mg/dL    Total Protein 7.0 6.0 - 8.5 g/dL    Albumin 4.0 3.5 - 5.2 g/dL    ALT (SGPT) 14 1 - 33 U/L    AST (SGOT) 22 1 - 32 U/L    Alkaline Phosphatase 68 39 - 117 U/L    Total Bilirubin 0.6 0.0 - 1.2 mg/dL    Globulin 3.0 gm/dL    A/G Ratio 1.3 g/dL    BUN/Creatinine Ratio 11.2 7.0 - 25.0    Anion Gap 12.9 5.0 - 15.0 mmol/L    eGFR 58.5 (L) >60.0 mL/min/1.73   High Sensitivity Troponin T    Collection Time: 03/13/25  8:44 PM    Specimen: Arm, Right; Blood   Result Value Ref Range    HS Troponin T 9 <14 ng/L   Green Top (Gel)    Collection Time: 03/13/25  8:44 PM   Result Value Ref Range    Extra Tube Hold for add-ons.    Lavender Top    Collection Time: 03/13/25  8:44 PM   Result Value Ref Range    Extra Tube hold for add-on    Gold Top - SST    Collection Time: 03/13/25  8:44 PM   Result Value Ref Range    Extra Tube Hold for add-ons.    Light Blue Top    Collection Time: 03/13/25  8:44 PM   Result Value Ref Range    Extra Tube Hold for add-ons.    CBC Auto Differential    Collection Time: 03/13/25  8:44 PM    Specimen: Arm, Right; Blood   Result Value Ref Range    WBC 8.63 3.40 - 10.80 10*3/mm3    RBC 4.72 3.77 - 5.28 10*6/mm3    Hemoglobin 14.1 12.0 - 15.9 g/dL    Hematocrit 42.2 34.0 - 46.6 %    MCV 89.4 79.0 - 97.0 fL    MCH 29.9 26.6  - 33.0 pg    MCHC 33.4 31.5 - 35.7 g/dL    RDW 12.2 (L) 12.3 - 15.4 %    RDW-SD 39.9 37.0 - 54.0 fl    MPV 9.8 6.0 - 12.0 fL    Platelets 309 140 - 450 10*3/mm3    Neutrophil % 55.5 42.7 - 76.0 %    Lymphocyte % 36.6 19.6 - 45.3 %    Monocyte % 6.3 5.0 - 12.0 %    Eosinophil % 0.8 0.3 - 6.2 %    Basophil % 0.5 0.0 - 1.5 %    Immature Grans % 0.3 0.0 - 0.5 %    Neutrophils, Absolute 4.79 1.70 - 7.00 10*3/mm3    Lymphocytes, Absolute 3.16 (H) 0.70 - 3.10 10*3/mm3    Monocytes, Absolute 0.54 0.10 - 0.90 10*3/mm3    Eosinophils, Absolute 0.07 0.00 - 0.40 10*3/mm3    Basophils, Absolute 0.04 0.00 - 0.20 10*3/mm3    Immature Grans, Absolute 0.03 0.00 - 0.05 10*3/mm3    nRBC 0.0 0.0 - 0.2 /100 WBC   D-dimer, Quantitative    Collection Time: 03/13/25  8:44 PM    Specimen: Arm, Right; Blood   Result Value Ref Range    D-Dimer, Quantitative 0.34 0.00 - 0.63 MCGFEU/mL   High Sensitivity Troponin T 1Hr    Collection Time: 03/13/25  9:54 PM    Specimen: Blood   Result Value Ref Range    HS Troponin T 9 <14 ng/L    Troponin T Numeric Delta 0 Abnormal if >/=3 ng/L       Ordered the above labs and independently interpreted results. My findings will be discussed in the medical decision making section below        RADIOLOGY  XR Chest 1 View  Result Date: 3/13/2025  SINGLE VIEW OF THE CHEST  HISTORY: Chest pain  COMPARISON: None available.  FINDINGS: Heart size is within normal limits. No pneumothorax, pleural effusion, or acute infiltrate is seen.      No acute findings.  This report was finalized on 3/13/2025 9:26 PM by Dr. Wendi Archer M.D on Workstation: [id number]        Ordered the above noted radiological studies.  Independently interpreted by me and my independent review of findings can be found in the ED Course.  See dictation for official radiology interpretation.      PROCEDURES    Procedures      EKG - Per my independent interpretation at 2047:    EKG Time: 2040  Rhythm/Rate: Sinus rhythm with a rate of 92  Normal  axis  Normal intervals  No acute ischemic changes  No STEMI       No emergent changes compared to March 2, 2018      MEDICATIONS GIVEN IN ER    Medications   sodium chloride 0.9 % flush 10 mL (has no administration in time range)   aspirin tablet 325 mg (325 mg Oral Given 3/13/25 2057)         PROGRESS, DATA ANALYSIS, CONSULTS, AND MEDICAL DECISION MAKING    Please note that this section constitutes my independent interpretation of clinical data including lab results, radiology, EKG's.  This constitutes my independent professional opinion regarding differential diagnosis and management of this patient.  It may include any factors such as history from outside sources, review of external records, social determinants of health, management of medications, response to those treatments, and discussions with other providers.    Differential Diagnosis and Plan: HEART score 2, initial concern for ACS, arrhythmia, electrolyte abnormalities, DVT, among others.  Plan for labs, EKG, chest x-ray, and reevaluation with results.    Additional sources:  - Discussed/ obtained information from independent historians:       - (Social Determinants of Health): None     - Shared decision making:  Patient fully updated on and in agreement with the course and plan moving forward    ED Course as of 03/13/25 2249   u Mar 13, 2025   2124 WBC: 8.63 [DC]   2124 Hemoglobin: 14.1 [DC]   2124 Platelets: 309 [DC]   2124 D-Dimer, Quant: 0.34 [DC]   2128 HS Troponin T: 9 [DC]   2128 Glucose(!): 134 [DC]   2128 BUN: 12 [DC]   2128 Creatinine(!): 1.07 [DC]   2128 Sodium: 140 [DC]   2128 Potassium: 3.5 [DC]   2128 XR Chest 1 View  Per my independent interpretation of the chest x-ray, no evidence of pneumothorax [DC]   2228 HS Troponin T: 9 [DC]   2228 Troponin T Numeric Delta: 0 [DC]   2247 Patient updated on the reassuring workup today, she is well-appearing in no acute distress.  She is comfortable with plan for discharge and outpatient follow-up.   ED return for worsening symptoms as needed. [DC]      ED Course User Index  [DC] Pedro Diaz MD       Hospitalization Considered?:     Orders Placed During This Visit:  Orders Placed This Encounter   Procedures    XR Chest 1 View    [place] Draw    Comprehensive Metabolic Panel    High Sensitivity Troponin T    CBC Auto Differential    D-dimer, Quantitative    High Sensitivity Troponin T 1Hr    Ambulatory Referral to Cardiology    NPO Diet NPO Type: Strict NPO    Undress & Gown    Continuous Pulse Oximetry    Oxygen Therapy- Nasal Cannula; Titrate 1-6 LPM Per SpO2; 90 - 95%    ECG 12 Lead ED Triage Standing Order; Chest Pain    ECG 12 Lead ED Triage Standing Order; Chest Pain    Insert Peripheral IV    CBC & Differential    Green Top (Gel)    Lavender Top    Gold Top - SST    Light Blue Top       Additional orders considered but not placed:      Independent interpretation of labs, radiology studies, and discussions with consultants: See ED Course        AS OF 22:49 EDT VITALS:    BP - 163/94  HR - 95  TEMP - 98.4 °F (36.9 °C) (Tympanic)  02 SATS - 96%          DIAGNOSIS  Final diagnoses:   Edema of left ankle   Chest discomfort         DISPOSITION  ED Disposition       ED Disposition   Discharge    Condition   Stable    Comment   --                Please note that portions of this document were completed with a voice recognition program.    Note Disclaimer: At [facility], we believe that sharing information builds trust and better relationships. You are receiving this note because you recently visited [facility]. It is possible you will see health information before a provider has talked with you about it. This kind of information can be easy to misunderstand. To help you fully understand what it means for your health, we urge you to discuss this note with your provider.                       Pedro Diaz MD  03/13/25 0746

## 2025-03-14 NOTE — DISCHARGE INSTRUCTIONS
Continue current medications, follow-up with PCP and cardiology as discussed, ED return for worsening symptoms as needed.

## 2025-03-25 ENCOUNTER — OFFICE VISIT (OUTPATIENT)
Dept: INTERNAL MEDICINE | Facility: CLINIC | Age: 65
End: 2025-03-25
Payer: COMMERCIAL

## 2025-03-25 VITALS
HEART RATE: 72 BPM | OXYGEN SATURATION: 97 % | HEIGHT: 68 IN | BODY MASS INDEX: 28.04 KG/M2 | RESPIRATION RATE: 18 BRPM | SYSTOLIC BLOOD PRESSURE: 140 MMHG | DIASTOLIC BLOOD PRESSURE: 76 MMHG | WEIGHT: 185 LBS

## 2025-03-25 DIAGNOSIS — E55.9 VITAMIN D DEFICIENCY: ICD-10-CM

## 2025-03-25 DIAGNOSIS — R73.03 PREDIABETES: ICD-10-CM

## 2025-03-25 DIAGNOSIS — Z00.00 ANNUAL PHYSICAL EXAM: Primary | ICD-10-CM

## 2025-03-25 DIAGNOSIS — E03.9 HYPOTHYROIDISM, ADULT: Chronic | ICD-10-CM

## 2025-03-25 DIAGNOSIS — R03.0 ELEVATED BLOOD PRESSURE READING: ICD-10-CM

## 2025-03-25 DIAGNOSIS — R73.01 IFG (IMPAIRED FASTING GLUCOSE): ICD-10-CM

## 2025-03-25 PROCEDURE — 99396 PREV VISIT EST AGE 40-64: CPT | Performed by: STUDENT IN AN ORGANIZED HEALTH CARE EDUCATION/TRAINING PROGRAM

## 2025-03-25 NOTE — PROGRESS NOTES
"Chief Complaint  Annual Exam    Subjective        Ivonne Camara presents to [facility]  History of Present Illness  /76 (BP Location: Left arm, Patient Position: Sitting, Cuff Size: Adult)   Pulse 72   Resp 18   Ht 172.7 cm (67.99\")   Wt 83.9 kg (185 lb)   LMP  (LMP Unknown)   SpO2 97%   BMI 28.14 kg/m²   Patient is here for yearly CPE. Last CPE was over 1 year ago.  She is doing well after recent ED visit.  Left ankle has improved.  Sees cardiologist next week.  Blood pressure is mildly elevated at that time as well as again in office and she has a way to check this at home.  Would like to avoid medication for this at this time; will advise about home readings are persistently elevated.  PMH, SH and FH reviewed. Changes in the FH: none .  Patient rates her own health as: good.  Tobacco use: in remote past, as per SH.  Alcohol use:once a week.  Recreational drugs use: none  Medication list rewieved.  Diet: regular.  Exercise: walking daily.  Marital status: .   Employment: retired.  Patient rates her stress level as: moderate.  Dental health: good. Brushes teeth 2 times a day, flosses 1 time a day . Dental visits: 2 times a year .  Vision correction: contacts in left eye only  Hearing: normal.    Recent vaccinations:   Flu  is up to date and recommended yearly  Tdap recommended every 10 years  Shingles prevention  previously declined    Patient is postmenopausal.       Current Outpatient Medications:     ALPRAZolam (XANAX) 0.5 MG tablet, TAKE 1 TABLET BY MOUTH AT NIGHT AS NEEDED FOR ANXIETY., Disp: 15 tablet, Rfl: 1    CALCIUM-MAGNESIUM-ZINC PO, Take 3 tablets by mouth Daily. 1000MG/400MG/25MG, Disp: , Rfl:     Cholecalciferol (Vitamin D3) 25 MCG (1000 UT) capsule, Take 1 capsule by mouth Daily., Disp: , Rfl:     levothyroxine (Synthroid) 100 MCG tablet, Take 1 tablet by mouth Daily., Disp: 90 tablet, Rfl: 1    methylPREDNISolone (MEDROL) 4 MG dose pack, Take as " "directed on package instructions., Disp: 21 tablet, Rfl: 0    Omega-3 Fatty Acids (fish oil) 1000 MG capsule capsule, Take  by mouth Daily With Breakfast., Disp: , Rfl:     Omega-3 Fatty Acids (fish oil) 1200 MG capsule capsule, Take 2 capsules by mouth Daily., Disp: , Rfl:     Probiotic Product (PROBIOTIC PO), Take 1 capsule by mouth Daily., Disp: , Rfl:     Unable to find, Take 0.5 each by mouth Every Night. Med Name: Delta 8 CBD gummy, Disp: , Rfl:       Risk evaluation:  1. Cardiovascular risk factors: age   2. Diabetes risk factors: prediabetes.  3. Cancer risk factors: h/o tocacco smoking.  4. Risky behavior: none. Use of seat belts: regular.       Prevention:  Cholesterol test  up to date. Cholesterol is mildly elevated..  Blood sugar test up to date. Fasting blood sugar is mildly elevated..  Hep C testing:  Mammogram up to date. Recommended frequency and time of the next screening per GYN.. Breast self exams recommended once a month.  Colonoscopy: up to date. Recommended every 10 years. Next screening is due in 2032..  PAP smear : up to date. Recommendations per GYN..  DEXA : up to date. Recommendations per GYN..                       Left foot numbness    Objective   Vital Signs:  /76 (BP Location: Left arm, Patient Position: Sitting, Cuff Size: Adult)   Pulse 72   Resp 18   Ht 172.7 cm (67.99\")   Wt 83.9 kg (185 lb)   SpO2 97%   BMI 28.14 kg/m²   Estimated body mass index is 28.14 kg/m² as calculated from the following:    Height as of this encounter: 172.7 cm (67.99\").    Weight as of this encounter: 83.9 kg (185 lb).    BMI is >= 25 and <30. (Overweight) The following options were offered after discussion;: exercise counseling/recommendations      Physical Exam  Vitals reviewed.   Constitutional:       General: She is not in acute distress.     Appearance: Normal appearance. She is normal weight. She is not ill-appearing or toxic-appearing.   HENT:      Head: Normocephalic and atraumatic.     "  Right Ear: Tympanic membrane and external ear normal. There is no impacted cerumen.      Left Ear: Tympanic membrane and external ear normal. There is no impacted cerumen.      Mouth/Throat:      Pharynx: No oropharyngeal exudate or posterior oropharyngeal erythema.   Eyes:      General:         Right eye: No discharge.         Left eye: No discharge.      Comments: Contact present left eye   Neck:      Thyroid: No thyromegaly.   Cardiovascular:      Rate and Rhythm: Normal rate and regular rhythm.      Pulses: Normal pulses.      Heart sounds: No murmur heard.  Pulmonary:      Effort: Pulmonary effort is normal. No respiratory distress.      Breath sounds: Normal breath sounds. No wheezing.   Abdominal:      General: Abdomen is flat. There is no distension.      Palpations: Abdomen is soft. There is no mass.      Tenderness: There is no abdominal tenderness.   Musculoskeletal:         General: No swelling, tenderness or deformity.      Right lower leg: No edema.      Left lower leg: No edema.      Right ankle: No swelling or deformity. Normal pulse.      Left ankle: No swelling or deformity. Normal pulse.   Feet:      Right foot:      Skin integrity: No ulcer.      Left foot:      Skin integrity: No ulcer.   Skin:     General: Skin is warm and dry.   Neurological:      Mental Status: She is alert and oriented to person, place, and time.      Sensory: No sensory deficit.      Motor: No weakness.   Psychiatric:         Attention and Perception: Attention normal.         Mood and Affect: Mood normal.         Speech: Speech normal.         Behavior: Behavior normal.         Thought Content: Thought content normal.         Judgment: Judgment normal.        Result Review :  The following data was reviewed by: Tasha Enamorado MD on 03/25/2025:  Common labs          9/5/2024    09:22 3/13/2025    20:44   Common Labs   Glucose 98  134    BUN 14  12    Creatinine 0.97  1.07    Sodium 140  140    Potassium 4.4  3.5     Chloride 101  103    Calcium 10.1  9.5    Albumin 4.4  4.0    Total Bilirubin 1.0  0.6    Alkaline Phosphatase 64  68    AST (SGOT) 23  22    ALT (SGPT) 17  14    WBC 7.7  8.63    Hemoglobin 14.1  14.1    Hematocrit 43.6  42.2    Platelets 304  309    Total Cholesterol 225     Triglycerides 104     HDL Cholesterol 65     LDL Cholesterol  142     Hemoglobin A1C 5.7                 Assessment and Plan   Diagnoses and all orders for this visit:    1. Annual physical exam (Primary)    2. IFG (impaired fasting glucose)  -     Comprehensive Metabolic Panel; Future  -     Hemoglobin A1c; Future  -     Lipid Panel With / Chol / HDL Ratio; Future    3. Hypothyroidism, adult  -     TSH Rfx On Abnormal To Free T4; Future  -     CBC (No Diff); Future  -     Lipid Panel With / Chol / HDL Ratio; Future    4. Vitamin D deficiency  -     Vitamin D,25-Hydroxy; Future    5. Prediabetes    6. Elevated blood pressure reading             Follow Up   Return in about 1 year (around 3/25/2026) for Annual physical.  Patient was given instructions and counseling regarding her condition or for health maintenance advice. Please see specific information pulled into the AVS if appropriate.

## 2025-03-31 ENCOUNTER — OFFICE VISIT (OUTPATIENT)
Dept: CARDIOLOGY | Age: 65
End: 2025-03-31
Payer: COMMERCIAL

## 2025-03-31 VITALS
WEIGHT: 185.2 LBS | DIASTOLIC BLOOD PRESSURE: 80 MMHG | HEART RATE: 62 BPM | BODY MASS INDEX: 34.97 KG/M2 | HEIGHT: 61 IN | OXYGEN SATURATION: 98 % | SYSTOLIC BLOOD PRESSURE: 130 MMHG

## 2025-03-31 DIAGNOSIS — R07.2 PRECORDIAL PAIN: ICD-10-CM

## 2025-03-31 DIAGNOSIS — E78.49 OTHER HYPERLIPIDEMIA: Primary | ICD-10-CM

## 2025-03-31 PROCEDURE — 93000 ELECTROCARDIOGRAM COMPLETE: CPT | Performed by: INTERNAL MEDICINE

## 2025-03-31 PROCEDURE — 99204 OFFICE O/P NEW MOD 45 MIN: CPT | Performed by: INTERNAL MEDICINE

## 2025-03-31 RX ORDER — LEVOTHYROXINE SODIUM 100 MCG
100 TABLET ORAL DAILY
Qty: 90 TABLET | Refills: 3 | Status: SHIPPED | OUTPATIENT
Start: 2025-03-31

## 2025-03-31 RX ORDER — MULTIVIT WITH MINERALS/LUTEIN
TABLET ORAL
COMMUNITY
Start: 2000-01-01

## 2025-03-31 NOTE — PROGRESS NOTES
"      CARDIOLOGY    Naomie Hartman MD    ENCOUNTER DATE:  2025    Ivonne Camara / 64 y.o. / female        CHIEF COMPLAINT / REASON FOR OFFICE VISIT     Chest Pain      HISTORY OF PRESENT ILLNESS       Chest Pain         Ivonne Camara is a 64 y.o. female     This lady went to the emergency room in 2025 for chest pain. She first started with some left ankle [place]. It became red and uncomfortable. She started to feel anxious that she might have a blood clot. Her blood pressure went up and she felt tight in her chest and neck and went to the emergency room where she had 2 negative high sensitivity troponins and a negative d-dimer and a normal EKG.    She comes in today for followup. She has not had recurrence of these symptoms. She walks daily and does pilates and weight exercises and has not noted any exertional dyspnea.     Her mother had atrial fibrillation but lived until 96. Her brother had bypass surgery and  at 62 of an aortic dissection. She is a former smoker.         VITAL SIGNS     Visit Vitals  /80   Pulse 62   Ht 154.9 cm (61\")   Wt 84 kg (185 lb 3.2 oz)   LMP  (LMP Unknown)   SpO2 98%   BMI 34.99 kg/m²         Wt Readings from Last 3 Encounters:   25 84 kg (185 lb 3.2 oz)   25 83.9 kg (185 lb)   24 83.6 kg (184 lb 6.4 oz)     Body mass index is 34.99 kg/m².      PHYSICAL EXAMINATION     Constitutional:       General: Not in acute distress.  Neck:      Vascular: No carotid bruit or JVD.   Pulmonary:      Effort: Pulmonary effort is normal.      Breath sounds: Normal breath sounds.   Cardiovascular:      Normal rate. Regular rhythm.      Murmurs: There is no murmur.   Psychiatric:         Mood and Affect: Mood and affect normal.           REVIEWED DATA       ECG 12 Lead    Date/Time: 3/31/2025 10:08 AM  Performed by: Naomie Hartman MD    Authorized by: Naomie Hartman MD  Comparison: compared with previous ECG from " 3/13/2025  Similar to previous ECG  Rhythm: sinus rhythm  BPM: 62  Conduction: conduction normal  ST Segments: ST segments normal  T Waves: T waves normal    Clinical impression: normal ECG            Lipid Panel          9/5/2024    09:22   Lipid Panel   Total Cholesterol 225    Triglycerides 104    HDL Cholesterol 65    VLDL Cholesterol 18    LDL Cholesterol  142        Lab Results   Component Value Date    GLUCOSE 134 (H) 03/13/2025    BUN 12 03/13/2025    CREATININE 1.07 (H) 03/13/2025     03/13/2025    K 3.5 03/13/2025     03/13/2025    CALCIUM 9.5 03/13/2025    PROTEINTOT 7.0 03/13/2025    ALBUMIN 4.0 03/13/2025    ALT 14 03/13/2025    AST 22 03/13/2025    ALKPHOS 68 03/13/2025    BILITOT 0.6 03/13/2025    GLOB 3.0 03/13/2025    AGRATIO 1.3 03/13/2025    BCR 11.2 03/13/2025    ANIONGAP 12.9 03/13/2025    EGFR 58.5 (L) 03/13/2025       ASSESSMENT & PLAN      Diagnosis Plan   1. Other hyperlipidemia        2. Precordial pain            Noncardiac chest pain consistent with anxiety. She went to the emergency room and ruled out for a myocardial infarction and also had a negative d-dimer. She has not had recurrence of symptoms and has no exertional anginal symptoms. I do not recommend any further testing at this point in time. She does report at the age of 51 she had an extensive cardiac workup prior to a potential surgery and was told everything was normal at that time.   Hyperlipidemia. I reviewed her lipid panel and suspect she is going to need statin therapy.   Anxiety.     We talked about screening tests. She is planning on getting a carotid Doppler and an abdominal aortic ultrasound and I agree with this. We discussed possibly doing a calcium score. I told her that if she has a CT scan for lung cancer screening I can generally look at the images and assess for any coronary artery calcification. While this does not give us a number like a calcium score would, coronary artery calcification would be  an indication to try to bring her LDL down to less than 70. I did explain that I thought she would need statin therapy to get her LDL to that level if indicated.     I am happy to see her back in the future if she has any changes in her symptoms, or if she needs help getting a calcium scan ordered, I told her to let me know and I could get it set up for her.           Orders Placed This Encounter   Procedures    ECG 12 Lead     This order was created via procedure documentation     Release to patient:   Routine Release [[id number]]           MEDICATIONS         Discharge Medications            Accurate as of March 31, 2025 10:08 AM. If you have any questions, ask your nurse or doctor.                Continue These Medications        Instructions Start Date   ALPRAZolam 0.5 MG tablet  Commonly known as: XANAX   0.5 mg, Oral, Nightly PRN      CALCIUM-MAGNESIUM-ZINC PO   3 tablets, Daily      fish oil 1000 MG capsule capsule   Daily With Breakfast      PROBIOTIC PO   1 capsule, Daily      Synthroid 100 MCG tablet  Generic drug: levothyroxine   100 mcg, Oral, Daily      Unable to find   0.5 each, Nightly      Vitamin D3 25 MCG (1000 UT) capsule   1,000 Units, Daily      Vitamin E 450 MG (1000 UT) capsule                  Naomie Hartman MD  03/31/25  10:08 EDT    Part of this note may be an electronic transcription/translation of spoken language to printed text using the Dragon dictation system.

## 2025-06-11 ENCOUNTER — TELEPHONE (OUTPATIENT)
Dept: INTERNAL MEDICINE | Facility: CLINIC | Age: 65
End: 2025-06-11
Payer: COMMERCIAL

## 2025-06-11 NOTE — TELEPHONE ENCOUNTER
Caller: Ivonne Camara    Relationship: Self    Best call back number: [phone]      What was the call regarding: PATIENT CALLING WANTED TO SEE IF SHE NEEDS TO COME IN AND HAVE LABS DRAWN DUE TO ALMOST BEING PRE DIABETIC WITH HER LAST LABS IN 9/5/24    PLEASE GIVE CALLBACK

## 2025-06-19 DIAGNOSIS — R73.01 IFG (IMPAIRED FASTING GLUCOSE): Primary | ICD-10-CM

## 2025-06-19 DIAGNOSIS — E03.9 HYPOTHYROIDISM, ADULT: ICD-10-CM

## 2025-06-19 DIAGNOSIS — E78.2 MIXED HYPERLIPIDEMIA: ICD-10-CM

## 2025-06-19 DIAGNOSIS — E55.9 VITAMIN D DEFICIENCY: ICD-10-CM

## (undated) DEVICE — CANN O2 ETCO2 FITS ALL CONN CO2 SMPL A/ 7IN DISP LF

## (undated) DEVICE — SINGLE-USE BIOPSY FORCEPS: Brand: RADIAL JAW 4

## (undated) DEVICE — TUBING, SUCTION, 1/4" X 10', STRAIGHT: Brand: MEDLINE

## (undated) DEVICE — LN SMPL CO2 SHTRM SD STREAM W/M LUER

## (undated) DEVICE — KT ORCA ORCAPOD DISP STRL

## (undated) DEVICE — SENSR O2 OXIMAX FNGR A/ 18IN NONSTR

## (undated) DEVICE — ADAPT CLN BIOGUARD AIR/H2O DISP